# Patient Record
Sex: FEMALE | Race: WHITE | NOT HISPANIC OR LATINO | Employment: UNEMPLOYED | URBAN - METROPOLITAN AREA
[De-identification: names, ages, dates, MRNs, and addresses within clinical notes are randomized per-mention and may not be internally consistent; named-entity substitution may affect disease eponyms.]

---

## 2020-10-07 ENCOUNTER — TELEPHONE (OUTPATIENT)
Dept: SURGICAL ONCOLOGY | Facility: CLINIC | Age: 50
End: 2020-10-07

## 2020-11-30 ENCOUNTER — TELEPHONE (OUTPATIENT)
Dept: SURGICAL ONCOLOGY | Facility: CLINIC | Age: 50
End: 2020-11-30

## 2020-12-01 ENCOUNTER — TELEPHONE (OUTPATIENT)
Dept: SURGICAL ONCOLOGY | Facility: CLINIC | Age: 50
End: 2020-12-01

## 2020-12-15 ENCOUNTER — TELEPHONE (OUTPATIENT)
Dept: SURGICAL ONCOLOGY | Facility: CLINIC | Age: 50
End: 2020-12-15

## 2020-12-16 ENCOUNTER — CONSULT (OUTPATIENT)
Dept: SURGICAL ONCOLOGY | Facility: CLINIC | Age: 50
End: 2020-12-16
Payer: COMMERCIAL

## 2020-12-16 VITALS
HEART RATE: 78 BPM | DIASTOLIC BLOOD PRESSURE: 98 MMHG | WEIGHT: 187 LBS | TEMPERATURE: 98 F | SYSTOLIC BLOOD PRESSURE: 148 MMHG | BODY MASS INDEX: 32.1 KG/M2 | RESPIRATION RATE: 20 BRPM

## 2020-12-16 DIAGNOSIS — Z15.01 BRCA2 GENE MUTATION POSITIVE: ICD-10-CM

## 2020-12-16 DIAGNOSIS — Z91.89 INCREASED RISK OF BREAST CANCER: Primary | ICD-10-CM

## 2020-12-16 DIAGNOSIS — L72.9 SKIN CYST: ICD-10-CM

## 2020-12-16 DIAGNOSIS — Z15.09 BRCA2 GENE MUTATION POSITIVE: ICD-10-CM

## 2020-12-16 PROCEDURE — 99244 OFF/OP CNSLTJ NEW/EST MOD 40: CPT | Performed by: NURSE PRACTITIONER

## 2020-12-16 RX ORDER — LISINOPRIL 10 MG/1
10 TABLET ORAL EVERY MORNING
COMMUNITY

## 2020-12-16 RX ORDER — ALPRAZOLAM 0.25 MG/1
0.25 TABLET ORAL AS NEEDED
COMMUNITY

## 2020-12-16 RX ORDER — CYCLOBENZAPRINE HCL 10 MG
10 TABLET ORAL AS NEEDED
COMMUNITY

## 2020-12-16 RX ORDER — VALACYCLOVIR HYDROCHLORIDE 1 G/1
1000 TABLET, FILM COATED ORAL DAILY
COMMUNITY

## 2020-12-16 RX ORDER — METOPROLOL TARTRATE 50 MG/1
50 TABLET, FILM COATED ORAL EVERY MORNING
COMMUNITY

## 2020-12-16 RX ORDER — HYDROCODONE BITARTRATE AND ACETAMINOPHEN 7.5; 325 MG/1; MG/1
1 TABLET ORAL AS NEEDED
COMMUNITY

## 2020-12-16 RX ORDER — CETIRIZINE HYDROCHLORIDE 10 MG/1
10 TABLET ORAL DAILY
COMMUNITY

## 2021-01-21 ENCOUNTER — CONSULT (OUTPATIENT)
Dept: PLASTIC SURGERY | Facility: CLINIC | Age: 51
End: 2021-01-21
Payer: COMMERCIAL

## 2021-01-21 VITALS
HEIGHT: 64 IN | BODY MASS INDEX: 32.63 KG/M2 | HEART RATE: 69 BPM | DIASTOLIC BLOOD PRESSURE: 92 MMHG | WEIGHT: 191.13 LBS | SYSTOLIC BLOOD PRESSURE: 137 MMHG | TEMPERATURE: 96 F

## 2021-01-21 DIAGNOSIS — Z15.01 BRCA2 GENE MUTATION POSITIVE: ICD-10-CM

## 2021-01-21 DIAGNOSIS — Z15.09 BRCA2 GENE MUTATION POSITIVE: ICD-10-CM

## 2021-01-21 PROCEDURE — 99244 OFF/OP CNSLTJ NEW/EST MOD 40: CPT | Performed by: PLASTIC SURGERY

## 2021-01-21 RX ORDER — MULTIVITAMIN
CAPSULE ORAL
COMMUNITY

## 2021-01-21 RX ORDER — CLINDAMYCIN PHOSPHATE 10 UG/ML
LOTION TOPICAL AS NEEDED
COMMUNITY
Start: 2020-10-01

## 2021-01-21 NOTE — PROGRESS NOTES
Assessment/Plan   Diagnoses and all orders for this visit:    BRCA2 gene mutation positive  -     Ambulatory referral to Plastic Surgery  -     CTA abdomen pelvis w wo contrast; Future    Other orders  -     clindamycin (CLEOCIN T) 1 % lotion; APPLY TO AFFECTED AREA TWICE A DAY  -     Multiple Vitamin (multivitamin) capsule; Take by mouth  -     CALCIUM PO; Take by mouth     Pertinent reconstruction options were presented as outlined below  A detailed conversation was had regarding the patients options for breast reconstruction  Five main points, which are explained to all breast reconstruction patients, were discussed  1) Breast reconstruction is an optional process  In addition, breast reconstruction can be performed in an immediate and delayed fashion  Even if a patient does not opt for reconstruction now, it can performed at a later time  2) Breast reconstruction is a multi-stage process which involves multiple surgeries spaced several months apart  The entire process can take over one year  The patient can stop within this process and/or resume it again at any time  3) The major goal of breast reconstruction is to have the patient look normal in clothing  When naked, there will always be scars and other stigmata of the breast reconstruction process  4) Asymmetries are often present during the reconstruction process  Several operations may be needed, including surgery to the non-cancerous breast, to achieve satisfactory results  5) No matter the reconstructive method, there are ways that the reconstruction can fail and a secondary reconstructive plan would need to be created  Next, a general discussion regarding all available methods of breast reconstruction were discussed  The types of reconstructions described included:    1) Tissue expander and implant based reconstruction, both single and multi-stage approaches    2) Autologous only reconstructions, including free abdominal-tissue based reconstructions  3) Combination procedures, particularly latissismus dorsi flaps combined with either expanders or implants  For each of the reconstruction methods mentioned above, the risks, benefits, alternatives, scarring, and recovery time were discussed in great detail  Specific risks detailed included bleeding, infection, hematoma, seroma, scarring, pain, wound healing complications, flap loss, fat necrosis, capsular contracture, need for implant removal, donor site complications, bulge, hernia, umbilical necrosis, need for urgent reoperation, and need for dressing changes were discussed  The patient would be a candidate for:     -Implant based reconstruction: Yes   -Abdominally based free flap reconstruction: Yes   -LD+implant based reconstruction: Yes      After a long discussion about these factors, the patient would like to pursue Bilateral nipple sparing mastectomy with immediate autologous reconstruction with JAMES flaps  We will plan to see her for a preoperative visit once a surgical date has been identified  60 minutes were spent face to face with the patient, of which over half were counseling and coordination of care  Alivia Felder MD   [facility] Reconstructive Surgery   [address]   [address]   [place], [address] [facility]   Office: [phone]        Subjective   Patient ID: Dallas White is a 48 y o  female  Vitals:    01/21/21 1258   BP: 137/92   Pulse: 69   Temp: (!) 96 °F (35 6 °C)     Mrs Mane Craig is a 48years old female who presents for breast reconstruction consultation  Patient manifest that she has history of BRCA 2 genetic mutation for which she underwent prophylactic hysterectomy and bilateral salpingo oophorectomy in 2015  She has been followed by breast surgery team with routine exams, imaging including Mammograms and MRIs with no suspicions lesions   She has been counseled about a prophylactic mastectomy given high risk of cancer development  She recently had a left breast cyst and was treated with drainage and abx and has recovered  She has family history of BRCA in her mother, two sisters and her daughter  She has Fhx of cancer in her maternal grandmother at age 45 and in one sister at age 43- 45 for which underwent mastectomy with implant reconstruction, but had multiple complications with implants  Patient is      Significant medical history: BRCA  Prior abdominal surgery: hysterectomy and ophorectomy  Tobacco use: none  Current bra size: C  Desired bra size: same  Body mass index is 32 81 kg/m²  PMH/FH of DVT/PE: none  PMH/FH of miscarriages: none    Patient would like to discuss options for breast reconstruction and would really like to avoid implants  The following portions of the patient's history were reviewed and updated as appropriate: allergies, current medications, past family history, past medical history, past social history, past surgical history and problem list     Review of Systems   Constitutional: Negative  HENT: Negative  Eyes: Negative  Respiratory: Negative  Cardiovascular: Negative  Gastrointestinal: Negative  Endocrine: Negative  Genitourinary: Negative  Musculoskeletal: Negative  Skin: Negative  Allergic/Immunologic: Negative  Neurological: Negative  Hematological: Negative  Psychiatric/Behavioral: Negative  Objective   Physical Exam   Constitutional  She appears well-developed and well-nourished  Eyes  Pupils are equal, round, and reactive to light  Cardiovascular: Normal rate  Pulmonary/Chest  Effort normal      Psychiatric  She has a normal mood and affect  Her behavior is normal      Abdomen and Hips  Soft  Hernia confirmed negative in the ventral area  Abdominal shape is panniculus  She has abdominal striae  She has vertical, abdominal skin redundancy  Patient has excess abdominal fat   Excess fat located in the: lower abdomen and jeffrey-umbilical    Soft tissue is adequate for bilateral breast reconstruction      Breast  Breast Measurements:   Right Left   A: Sternal notch to nipple distance (cm) 26 25 5   B: Midsternum to nipple distance (cm)     C: Midclavicle to nipple distance (cm)     D: Nipple to inframammary fold (cm) 10 11   E: Base width (cm) 17 17   F: Inframammary distance (cm) 25 5      Right Left   Areolar diameter (cm) 5 5   Nipple diameter (cm)     Superior tissue pinch test (cm)     Breast ptosis (grade 0-3) 1 1         Her breasts have normal envelope compliance  Her breasts have adequate tissue coverage  Additional breast conditions include the following:   Right Breast: She is negative for a right mass  Left Breast: She is negative for a left mass

## 2021-02-11 ENCOUNTER — TELEPHONE (OUTPATIENT)
Dept: SURGICAL ONCOLOGY | Facility: CLINIC | Age: 51
End: 2021-02-11

## 2021-02-15 ENCOUNTER — HOSPITAL ENCOUNTER (OUTPATIENT)
Dept: RADIOLOGY | Facility: HOSPITAL | Age: 51
Discharge: HOME | End: 2021-02-15
Attending: SURGERY
Payer: COMMERCIAL

## 2021-02-15 ENCOUNTER — APPOINTMENT (OUTPATIENT)
Dept: LAB | Facility: CLINIC | Age: 51
End: 2021-02-15
Payer: COMMERCIAL

## 2021-02-15 ENCOUNTER — LAB (OUTPATIENT)
Dept: LAB | Facility: CLINIC | Age: 51
End: 2021-02-15
Payer: COMMERCIAL

## 2021-02-15 ENCOUNTER — OFFICE VISIT (OUTPATIENT)
Dept: SURGICAL ONCOLOGY | Facility: CLINIC | Age: 51
End: 2021-02-15
Payer: COMMERCIAL

## 2021-02-15 ENCOUNTER — TRANSCRIBE ORDERS (OUTPATIENT)
Dept: RADIOLOGY | Facility: HOSPITAL | Age: 51
End: 2021-02-15

## 2021-02-15 VITALS
BODY MASS INDEX: 32.44 KG/M2 | HEART RATE: 60 BPM | SYSTOLIC BLOOD PRESSURE: 112 MMHG | TEMPERATURE: 98 F | WEIGHT: 190 LBS | DIASTOLIC BLOOD PRESSURE: 74 MMHG | RESPIRATION RATE: 14 BRPM | HEIGHT: 64 IN

## 2021-02-15 DIAGNOSIS — Z15.09 BRCA2 GENE MUTATION POSITIVE: ICD-10-CM

## 2021-02-15 DIAGNOSIS — Z01.818 PREOPERATIVE TESTING: ICD-10-CM

## 2021-02-15 DIAGNOSIS — Z15.01 BRCA2 GENE MUTATION POSITIVE: ICD-10-CM

## 2021-02-15 DIAGNOSIS — Z91.89 INCREASED RISK OF BREAST CANCER: ICD-10-CM

## 2021-02-15 DIAGNOSIS — Z01.818 PREOP EXAMINATION: Primary | ICD-10-CM

## 2021-02-15 LAB
ALBUMIN SERPL BCP-MCNC: 3.8 G/DL (ref 3.5–5)
ALP SERPL-CCNC: 99 U/L (ref 46–116)
ALT SERPL W P-5'-P-CCNC: 29 U/L (ref 12–78)
ANION GAP SERPL CALCULATED.3IONS-SCNC: 10 MMOL/L (ref 4–13)
AST SERPL W P-5'-P-CCNC: 20 U/L (ref 5–45)
BASOPHILS # BLD AUTO: 0.1 THOUSANDS/ΜL (ref 0–0.1)
BASOPHILS NFR BLD AUTO: 1 % (ref 0–1)
BILIRUB SERPL-MCNC: 0.34 MG/DL (ref 0.2–1)
BUN SERPL-MCNC: 17 MG/DL (ref 5–25)
CALCIUM SERPL-MCNC: 9.3 MG/DL (ref 8.3–10.1)
CHLORIDE SERPL-SCNC: 104 MMOL/L (ref 100–108)
CO2 SERPL-SCNC: 28 MMOL/L (ref 21–32)
CREAT SERPL-MCNC: 0.66 MG/DL (ref 0.6–1.3)
EOSINOPHIL # BLD AUTO: 0.22 THOUSAND/ΜL (ref 0–0.61)
EOSINOPHIL NFR BLD AUTO: 3 % (ref 0–6)
ERYTHROCYTE [DISTWIDTH] IN BLOOD BY AUTOMATED COUNT: 12.1 % (ref 11.6–15.1)
GFR SERPL CREATININE-BSD FRML MDRD: 103 ML/MIN/1.73SQ M
GLUCOSE P FAST SERPL-MCNC: 87 MG/DL (ref 65–99)
HCT VFR BLD AUTO: 43.7 % (ref 34.8–46.1)
HGB BLD-MCNC: 14.6 G/DL (ref 11.5–15.4)
IMM GRANULOCYTES # BLD AUTO: 0.02 THOUSAND/UL (ref 0–0.2)
IMM GRANULOCYTES NFR BLD AUTO: 0 % (ref 0–2)
LYMPHOCYTES # BLD AUTO: 1.44 THOUSANDS/ΜL (ref 0.6–4.47)
LYMPHOCYTES NFR BLD AUTO: 20 % (ref 14–44)
MCH RBC QN AUTO: 31 PG (ref 26.8–34.3)
MCHC RBC AUTO-ENTMCNC: 33.4 G/DL (ref 31.4–37.4)
MCV RBC AUTO: 93 FL (ref 82–98)
MONOCYTES # BLD AUTO: 0.49 THOUSAND/ΜL (ref 0.17–1.22)
MONOCYTES NFR BLD AUTO: 7 % (ref 4–12)
NEUTROPHILS # BLD AUTO: 5.07 THOUSANDS/ΜL (ref 1.85–7.62)
NEUTS SEG NFR BLD AUTO: 69 % (ref 43–75)
NRBC BLD AUTO-RTO: 0 /100 WBCS
PLATELET # BLD AUTO: 348 THOUSANDS/UL (ref 149–390)
PMV BLD AUTO: 9.9 FL (ref 8.9–12.7)
POTASSIUM SERPL-SCNC: 4 MMOL/L (ref 3.5–5.3)
PROT SERPL-MCNC: 8 G/DL (ref 6.4–8.2)
RBC # BLD AUTO: 4.71 MILLION/UL (ref 3.81–5.12)
SODIUM SERPL-SCNC: 142 MMOL/L (ref 136–145)
WBC # BLD AUTO: 7.34 THOUSAND/UL (ref 4.31–10.16)

## 2021-02-15 PROCEDURE — 85025 COMPLETE CBC W/AUTO DIFF WBC: CPT

## 2021-02-15 PROCEDURE — 80053 COMPREHEN METABOLIC PANEL: CPT

## 2021-02-15 PROCEDURE — 36415 COLL VENOUS BLD VENIPUNCTURE: CPT

## 2021-02-15 PROCEDURE — 71046 X-RAY EXAM CHEST 2 VIEWS: CPT

## 2021-02-15 PROCEDURE — 99215 OFFICE O/P EST HI 40 MIN: CPT | Performed by: SURGERY

## 2021-02-15 PROCEDURE — 93005 ELECTROCARDIOGRAM TRACING: CPT

## 2021-02-15 NOTE — PROGRESS NOTES
Surgical Oncology Follow Up       [address] [facility] SURGICAL ONCOLOGY [place]  [address] [facility] [address]    Liudmila Islas  1970  [id number]  [address] [facility]COL[facility] [place]  [address] [facility] [address]    Chief Complaint   Patient presents with    Follow-up     Pt is here for a three month follow up          Assessment & Plan:   Patient presents for follow-up visit  She has seen her advanced practitioner for her original consult regarding a BRCA 2 mutation  She has seen Plastic surgery is recommended bilateral nipple sparing Macarena flaps  The patient I went through the process of informed consent for this  She was not clear where the incisions would be made  Cancer History:     Oncology History    No history exists  Interval History:     Patient was diagnosed with a BRCA 2 mutation many years ago and saw our Genetics counselor who conveyed her results to her at that time  She has been follow also with Dr Jesse Bo in the practice  She has had recent mammograms and MRIs which were benign  I    Review of Systems:   Review of Systems   Constitutional: Negative for activity change, appetite change and fatigue  HENT: Negative  Eyes: Negative  Respiratory: Negative for cough, shortness of breath and wheezing  Cardiovascular: Negative for chest pain and leg swelling  Gastrointestinal: Negative  Endocrine: Negative  Genitourinary: Negative  Musculoskeletal:        No new changes or complaints of bone pain   Skin: Negative  Allergic/Immunologic: Negative  Neurological: Negative  Hematological: Negative  Psychiatric/Behavioral: Negative  Past Medical History     Patient Active Problem List   Diagnosis    Increased risk of breast cancer    BRCA2 gene mutation positive    Skin cyst     No past medical history on file    Past Surgical History:   Procedure Laterality Date    CARPAL TUNNEL RELEASE Bilateral 12/2017    LAPAROSCOPIC TOTAL HYSTERECTOMY      OOPHORECTOMY Bilateral 02/27/2015     Family History   Problem Relation Age of Onset    Ovarian cancer Mother 76    BRCA2 Positive Mother     Breast cancer Sister 40    BRCA2 Positive Sister     BRCA2 Positive Daughter     Breast cancer Maternal Grandmother 45    BRCA2 Positive Sister      Social History     Socioeconomic History    Marital status:      Spouse name: Not on file    Number of children: Not on file    Years of education: Not on file    Highest education level: Not on file   Occupational History    Not on file   Social Needs    Financial resource strain: Not on file    Food insecurity     Worry: Not on file     Inability: Not on file    Transportation needs     Medical: Not on file     Non-medical: Not on file   Tobacco Use    Smoking status: Never Smoker    Smokeless tobacco: Never Used   Substance and Sexual Activity    Alcohol use: Yes     Frequency: 2-3 times a week    Drug use: Never    Sexual activity: Not on file   Lifestyle    Physical activity     Days per week: Not on file     Minutes per session: Not on file    Stress: Not on file   Relationships    Social connections     Talks on phone: Not on file     Gets together: Not on file     Attends Mandaeism service: Not on file     Active member of club or organization: Not on file     Attends meetings of clubs or organizations: Not on file     Relationship status: Not on file    Intimate partner violence     Fear of current or ex partner: Not on file     Emotionally abused: Not on file     Physically abused: Not on file     Forced sexual activity: Not on file   Other Topics Concern    Not on file   Social History Narrative    Not on file       Current Outpatient Medications:     ALPRAZolam (XANAX) 0 25 mg tablet, Take 0 25 mg by mouth as needed for anxiety, Disp: , Rfl:     CALCIUM PO, Take by mouth, Disp: , Rfl:    cetirizine (ZyrTEC) 10 mg tablet, Take 10 mg by mouth daily, Disp: , Rfl:     clindamycin (CLEOCIN T) 1 % lotion, APPLY TO AFFECTED AREA TWICE A DAY, Disp: , Rfl:     cyclobenzaprine (FLEXERIL) 10 mg tablet, Take 10 mg by mouth as needed for muscle spasms, Disp: , Rfl:     HYDROcodone-acetaminophen (NORCO) 7 5-325 mg per tablet, Take 1 tablet by mouth as needed for pain, Disp: , Rfl:     lisinopril (ZESTRIL) 10 mg tablet, Take 10 mg by mouth daily, Disp: , Rfl:     metoprolol tartrate (LOPRESSOR) 50 mg tablet, Take 50 mg by mouth daily, Disp: , Rfl:     Multiple Vitamin (multivitamin) capsule, Take by mouth, Disp: , Rfl:     sertraline (ZOLOFT) 50 mg tablet, Take 50 mg by mouth daily, Disp: , Rfl:     valACYclovir (VALTREX) 1,000 mg tablet, Take 1,000 mg by mouth daily, Disp: , Rfl:   Allergies   Allergen Reactions    Amoxicillin-Pot Clavulanate Diarrhea       Physical Exam:     Vitals:    02/15/21 0801   BP: 112/74   Pulse: 60   Resp: 14   Temp: 98 °F (36 7 °C)     Physical Exam  Vitals signs reviewed  Constitutional:       Appearance: She is well-developed  HENT:      Head: Normocephalic and atraumatic  Eyes:      Pupils: Pupils are equal, round, and reactive to light  Neck:      Musculoskeletal: Normal range of motion and neck supple  Thyroid: No thyromegaly  Vascular: No JVD  Trachea: No tracheal deviation  Cardiovascular:      Rate and Rhythm: Normal rate and regular rhythm  Heart sounds: Normal heart sounds  No murmur  No friction rub  No gallop  Pulmonary:      Effort: Pulmonary effort is normal  No respiratory distress  Breath sounds: Normal breath sounds  No wheezing or rales  Chest:      Comments: The right breast was examined in the sitting and supine position  There are no worrisome skin changes, tenderness, inverted nipple, nipple discharge, swelling, bleeding or evidence of a mass in any quadrant    Jean survey demonstrated no evidence of any clinically suspicious axillary, pectoral or paraclavicular lymph nodes  The left breast was examined in the sitting and supine position  There are no worrisome skin changes, tenderness, inverted nipple, nipple discharge, swelling, bleeding or evidence of a mass in any quadrant  Jean survey demonstrated no evidence of any clinically suspicious axillary, pectoral or paraclavicular lymph nodes  Abdominal:      General: There is no distension  Palpations: Abdomen is soft  There is no hepatomegaly or mass  Tenderness: There is no abdominal tenderness  There is no guarding or rebound  Musculoskeletal: Normal range of motion  General: No tenderness  Lymphadenopathy:      Cervical: No cervical adenopathy  Skin:     General: Skin is warm and dry  Findings: No erythema or rash  Neurological:      Mental Status: She is alert and oriented to person, place, and time  Cranial Nerves: No cranial nerve deficit  Psychiatric:         Behavior: Behavior normal            Results & Discussion:    the patient had thought about reconstructed for many years  Her family members have had reconstruction as well  She is well for educated regarding the process  All questions were answered the patient's satisfaction  The patient and I underwent the process of consent for   Bilateral nipple sparing mastectomies  The complications outlined on the consent including relatively minor problems (wound infection, wound healing problems, hematomas, scarring, chronic discomfort/pain), moderate problems (injury to nerves or blood vessels, allergic reactions) and major complications (extensive blood loss requiring transfusions or possible addtional surgeries, cardiac arrest, stroke and possible death)  We also reviewed specific complications as outlined on the consent form including  Possible development of breast cancer in the future though unlikely    All questions were answered to the patient's satisfaction  We will coordinate the surgery at our next mutual convience  Advance Care Planning/Advance Directives:  I discussed the disease status, treatment plans and follow-up with the patient

## 2021-02-15 NOTE — H&P (VIEW-ONLY)
Surgical Oncology Follow Up       [address] [facility],[address] [place] [address]  [facility] [place]  [address] [facility] [address]    Roderick Hensley  1970  [id number]  [address] [facility],[address] [place] [address]  [facility] [place]  [address] [facility] [address]    Chief Complaint   Patient presents with    Follow-up     Pt is here for a three month follow up          Assessment & Plan:   Patient presents for follow-up visit  She has seen her advanced practitioner for her original consult regarding a BRCA 2 mutation  She has seen Plastic surgery is recommended bilateral nipple sparing Macarena flaps  The patient I went through the process of informed consent for this  She was not clear where the incisions would be made  Cancer History:     Oncology History    No history exists  Interval History:     Patient was diagnosed with a BRCA 2 mutation many years ago and saw our Genetics counselor who conveyed her results to her at that time  She has been follow also with Dr Almaraz in the practice  She has had recent mammograms and MRIs which were benign  I    Review of Systems:   Review of Systems   Constitutional: Negative for activity change, appetite change and fatigue  HENT: Negative  Eyes: Negative  Respiratory: Negative for cough, shortness of breath and wheezing  Cardiovascular: Negative for chest pain and leg swelling  Gastrointestinal: Negative  Endocrine: Negative  Genitourinary: Negative  Musculoskeletal:        No new changes or complaints of bone pain   Skin: Negative  Allergic/Immunologic: Negative  Neurological: Negative  Hematological: Negative  Psychiatric/Behavioral: Negative  Past Medical History     Patient Active Problem List   Diagnosis    Increased risk of breast cancer    BRCA2 gene mutation positive    Skin cyst     No past medical history on file    Past Surgical History:   Procedure Laterality Date    CARPAL TUNNEL RELEASE Bilateral 12/2017    LAPAROSCOPIC TOTAL HYSTERECTOMY      OOPHORECTOMY Bilateral 02/27/2015     Family History   Problem Relation Age of Onset    Ovarian cancer Mother 76    BRCA2 Positive Mother     Breast cancer Sister 40    BRCA2 Positive Sister     BRCA2 Positive Daughter     Breast cancer Maternal Grandmother 45    BRCA2 Positive Sister      Social History     Socioeconomic History    Marital status:      Spouse name: Not on file    Number of children: Not on file    Years of education: Not on file    Highest education level: Not on file   Occupational History    Not on file   Social Needs    Financial resource strain: Not on file    Food insecurity     Worry: Not on file     Inability: Not on file    Transportation needs     Medical: Not on file     Non-medical: Not on file   Tobacco Use    Smoking status: Never Smoker    Smokeless tobacco: Never Used   Substance and Sexual Activity    Alcohol use: Yes     Frequency: 2-3 times a week    Drug use: Never    Sexual activity: Not on file   Lifestyle    Physical activity     Days per week: Not on file     Minutes per session: Not on file    Stress: Not on file   Relationships    Social connections     Talks on phone: Not on file     Gets together: Not on file     Attends Church service: Not on file     Active member of club or organization: Not on file     Attends meetings of clubs or organizations: Not on file     Relationship status: Not on file    Intimate partner violence     Fear of current or ex partner: Not on file     Emotionally abused: Not on file     Physically abused: Not on file     Forced sexual activity: Not on file   Other Topics Concern    Not on file   Social History Narrative    Not on file       Current Outpatient Medications:     ALPRAZolam (XANAX) 0 25 mg tablet, Take 0 25 mg by mouth as needed for anxiety, Disp: , Rfl:     CALCIUM PO, Take by mouth, Disp: , Rfl:    cetirizine (ZyrTEC) 10 mg tablet, Take 10 mg by mouth daily, Disp: , Rfl:     clindamycin (CLEOCIN T) 1 % lotion, APPLY TO AFFECTED AREA TWICE A DAY, Disp: , Rfl:     cyclobenzaprine (FLEXERIL) 10 mg tablet, Take 10 mg by mouth as needed for muscle spasms, Disp: , Rfl:     HYDROcodone-acetaminophen (NORCO) 7 5-325 mg per tablet, Take 1 tablet by mouth as needed for pain, Disp: , Rfl:     lisinopril (ZESTRIL) 10 mg tablet, Take 10 mg by mouth daily, Disp: , Rfl:     metoprolol tartrate (LOPRESSOR) 50 mg tablet, Take 50 mg by mouth daily, Disp: , Rfl:     Multiple Vitamin (multivitamin) capsule, Take by mouth, Disp: , Rfl:     sertraline (ZOLOFT) 50 mg tablet, Take 50 mg by mouth daily, Disp: , Rfl:     valACYclovir (VALTREX) 1,000 mg tablet, Take 1,000 mg by mouth daily, Disp: , Rfl:   Allergies   Allergen Reactions    Amoxicillin-Pot Clavulanate Diarrhea       Physical Exam:     Vitals:    02/15/21 0801   BP: 112/74   Pulse: 60   Resp: 14   Temp: 98 °F (36 7 °C)     Physical Exam  Vitals signs reviewed  Constitutional:       Appearance: She is well-developed  HENT:      Head: Normocephalic and atraumatic  Eyes:      Pupils: Pupils are equal, round, and reactive to light  Neck:      Musculoskeletal: Normal range of motion and neck supple  Thyroid: No thyromegaly  Vascular: No JVD  Trachea: No tracheal deviation  Cardiovascular:      Rate and Rhythm: Normal rate and regular rhythm  Heart sounds: Normal heart sounds  No murmur  No friction rub  No gallop  Pulmonary:      Effort: Pulmonary effort is normal  No respiratory distress  Breath sounds: Normal breath sounds  No wheezing or rales  Chest:      Comments: The right breast was examined in the sitting and supine position  There are no worrisome skin changes, tenderness, inverted nipple, nipple discharge, swelling, bleeding or evidence of a mass in any quadrant    Jean survey demonstrated no evidence of any clinically suspicious axillary, pectoral or paraclavicular lymph nodes  The left breast was examined in the sitting and supine position  There are no worrisome skin changes, tenderness, inverted nipple, nipple discharge, swelling, bleeding or evidence of a mass in any quadrant  Jean survey demonstrated no evidence of any clinically suspicious axillary, pectoral or paraclavicular lymph nodes  Abdominal:      General: There is no distension  Palpations: Abdomen is soft  There is no hepatomegaly or mass  Tenderness: There is no abdominal tenderness  There is no guarding or rebound  Musculoskeletal: Normal range of motion  General: No tenderness  Lymphadenopathy:      Cervical: No cervical adenopathy  Skin:     General: Skin is warm and dry  Findings: No erythema or rash  Neurological:      Mental Status: She is alert and oriented to person, place, and time  Cranial Nerves: No cranial nerve deficit  Psychiatric:         Behavior: Behavior normal            Results & Discussion:    the patient had thought about reconstructed for many years  Her family members have had reconstruction as well  She is well for educated regarding the process  All questions were answered the patient's satisfaction  The patient and I underwent the process of consent for   Bilateral nipple sparing mastectomies  The complications outlined on the consent including relatively minor problems (wound infection, wound healing problems, hematomas, scarring, chronic discomfort/pain), moderate problems (injury to nerves or blood vessels, allergic reactions) and major complications (extensive blood loss requiring transfusions or possible addtional surgeries, cardiac arrest, stroke and possible death)  We also reviewed specific complications as outlined on the consent form including  Possible development of breast cancer in the future though unlikely    All questions were answered to the patient's satisfaction  We will coordinate the surgery at our next mutual convience  Advance Care Planning/Advance Directives:  I discussed the disease status, treatment plans and follow-up with the patient

## 2021-02-15 NOTE — PATIENT INSTRUCTIONS
Pre-Surgery Instructions:   Medication Instructions    ALPRAZolam (XANAX) 0 25 mg tablet Take morning of surgery IF NEEDED    CALCIUM PO Stop taking 1 week prior to surgery    cetirizine (ZyrTEC) 10 mg tablet Stop taking 1 days prior to surgery    clindamycin (CLEOCIN T) 1 % lotion Stop taking 1 days prior to surgery    cyclobenzaprine (FLEXERIL) 10 mg tablet Stop taking 1 days prior to surgery    HYDROcodone-acetaminophen (NORCO) 7 5-325 mg per tablet Stop taking 1 days prior to surgery    lisinopril (ZESTRIL) 10 mg tablet Stop taking 1 days prior to surgery    metoprolol tartrate (LOPRESSOR) 50 mg tablet Take morning of surgery    Multiple Vitamin (multivitamin) capsule Stop taking 1 week prior to surgery    sertraline (ZOLOFT) 50 mg tablet Take morning of surgery    valACYclovir (VALTREX) 1,000 mg tablet Stop taking 1 days prior to surgery           Mastectomy   AMBULATORY CARE:   What you need to know about a mastectomy:  A mastectomy is surgery to remove all or part of one or both breasts  Tissue, lymph nodes, or muscle near the breast may also be removed  A mastectomy may be done to treat breast cancer or prevent the cancer from spreading  The type of mastectomy used depends on the size of the tumor and if the cancer has spread  A mastectomy can also be done to prevent breast cancer  This may be a choice if you are at high risk for breast cancer  How to prepare for a mastectomy:   · Your surgeon will talk to you about how to prepare for surgery  He or she may tell you not to eat or drink anything after midnight on the day before your surgery  Arrange for someone to drive you home and stay with you after surgery  This person can help care for you and watch for complications from surgery  · Tell your surgeon about all medicines you currently take  He or she will tell you if you need to stop any medicine for the surgery, and when to stop   You may need to stop taking aspirin or blood thinners several days before surgery  He or she will tell you which medicines to take or not take on the day of surgery  · Tell your healthcare provider or surgeon about all your allergies, including allergic reactions to medicine, anesthesia, or antibiotics  What will happen during a mastectomy:   · You will be given general anesthesia to keep you asleep and free from pain during surgery  You may be given an antibiotic to help prevent a bacterial infection  · Your surgeon will make an incision over your breast  He or she will remove the tumor and breast tissue  The nipple and areola (area around the nipple) may be removed  Surgery that leaves these in place is called nipple-sparing mastectomy  Your surgeon may also remove muscle from behind your breast  If lymph nodes will be taken, a small incision will be made in your armpit  The lymph nodes will be removed and tested for cancer  · Your surgeon may leave extra skin if you are having reconstruction surgery  This surgery is called skin-sparing mastectomy  Reconstruction surgery helps make the breast look more natural  It is done right after the mastectomy  · One or more drains may be inserted near your incision  A drain removes extra fluid and helps your incision heal  Your surgeon will close your incision with stitches and cover it with a bandage  He or she may also wrap a tight-fitting bandage around both of your breasts  This may decrease swelling, bleeding, and pain  What to expect after a mastectomy:   · You will be helped to walk around after surgery to help prevent blood clots  · Healthcare providers will monitor you until you are awake  You may need to spend 1 to 2 nights in the hospital     · You may have trouble moving the arm closest to your mastectomy  This should get better in a few days  Risks of a mastectomy:  You may bleed more than expected or develop an infection   Nerves, blood vessels, and muscles may be damaged during your surgery  Blood or fluid may collect under your skin  You may need other procedures to remove the fluid or blood  You may have swelling in the arm closest to the mastectomy or where lymph nodes were removed  This swelling is called lymphedema  Lymphedema may cause tingling, numbness, stiffness, and weakness in your arm  This may be permanent  You may get a blood clot in your arm or leg  The blood clot may travel to your heart, lungs, or brain  This may become life-threatening  Call your local emergency number (911 in the [address] [facility]) for any of the following:   · You feel lightheaded, short of breath, and have chest pain  · You have trouble breathing  · You cough up blood  Seek care immediately if:   · Blood soaks through your bandage  · Your stitches come apart  · Your bruise suddenly gets bigger  · Your leg or arm is larger than usual and painful  Call your doctor or surgeon if:   · You have a fever or chills  · Your wound is red, swollen, or draining pus  · You have nausea or are vomiting  · Your skin is itchy, swollen, or you have a rash  · Your pain does not get better after you take pain medicine  · Your drain falls out or stops draining fluid  · Your drain has pus or foul-smelling fluid coming out of it  · You have numbness, tingling, or swelling in your arm or hand  · You feel very sad or anxious, or are having trouble coping with changes from the mastectomy  · You have questions or concerns about your condition or care  Medicines: You may need any of the following:  · Antibiotics  help prevent a bacterial infection  · Prescription pain medicine  may be given  Ask your healthcare provider how to take this medicine safely  Some prescription pain medicines contain acetaminophen  Do not take other medicines that contain acetaminophen without talking to your healthcare provider  Too much acetaminophen may cause liver damage   Prescription pain medicine may cause constipation  Ask your healthcare provider how to prevent or treat constipation  · NSAIDs , such as ibuprofen, help decrease swelling, pain, and fever  NSAIDs can cause stomach bleeding or kidney problems in certain people  If you take blood thinner medicine, always ask your healthcare provider if NSAIDs are safe for you  Always read the medicine label and follow directions  · Take your medicine as directed  Contact your healthcare provider if you think your medicine is not helping or if you have side effects  Tell him or her if you are allergic to any medicine  Keep a list of the medicines, vitamins, and herbs you take  Include the amounts, and when and why you take them  Bring the list or the pill bottles to follow-up visits  Carry your medicine list with you in case of an emergency  Care for your wound as directed: If you have a tight-fitting bandage, you can remove it in 24 to 48 hours, or as directed  Ask your healthcare provider when your incision can get wet  You may need to take a sponge bath until your drain is removed  Carefully wash around the incision with soap and water  It is okay to allow the soap and water to gently run over your incision  Gently pat dry the area and put on new, clean bandages as directed  Change your bandages when they get wet or dirty  If lymph nodes were removed from your armpit, ask your healthcare provider when you can wear deodorant  Check your incision every day for redness, pus, or swelling  Self-care:   · Apply ice  on your incision for 15 to 20 minutes every hour or as directed  Use an ice pack, or put crushed ice in a plastic bag  Cover it with a towel  Ice helps prevent tissue damage and decreases swelling and pain  · Elevate  your arm nearest to your incision above the level of your heart  Do this as often as you can  This will help decrease swelling and pain  Prop your arm on pillows or blankets to keep it elevated comfortably  · Rest  as directed   Do not lift anything heavier than 5 pounds  Do not push or pull with your arms  You can use your arms to groom, eat, and bathe  Take short walks around the house  Gradually walk further as you feel better  Ask your healthcare provider when you can return to your normal activities  · Do not sleep on your stomach  This will put too much pressure on your incision  Sleep on your back or on the opposite side of your incision  · Empty your drain  as directed  You may need to write down how much fluid you empty from your drain each day  Ask your healthcare provider for more information about how to empty your drain  · Wear a supportive bra  as directed  Wait until you remove the tight-fitting bandage to wear a bra  You may be given a surgical bra or told to wear a sports bra  A supportive bra may help hold your bandages in place  It may also help with swelling and pain  Do not  wear bras with lace or underwire  They may rub against your incision and cause discomfort  Arm stretches: Your healthcare provider may show you how to do arm stretches  Arm stretches may prevent stiff arms or shoulders  You may need to wait until after your drains are removed to begin stretching  Do not do arm stretches until your healthcare provider says it is okay  Ask your healthcare provider for more information about arm stretches  Follow up with your doctor or surgeon as directed:  Write down your questions so you remember to ask them during your visits  For support and more information:  You may have difficulty coping with the changes to your body  Talk to your family or friends about how you are feeling  Ask your healthcare provider about support groups  It may be helpful to talk with other women who have had a mastectomy  · [address] [place] [address]  Phone: [phone]  Web Address: http://[employer]  org  · [address] [facility], [address]  [place] [address]  Phone: [phone]  Web Address: http://[employer]/  [address] Information is for End User's use only and may not be sold, redistributed or otherwise used for commercial purposes  All illustrations and images included in CareNotes® are the copyrighted property of [employer] M [employer] Inc  or [employer]  The above information is an  only  It is not intended as medical advice for individual conditions or treatments  Talk to your doctor, nurse or pharmacist before following any medical regimen to see if it is safe and effective for you  Sean-Franco Drain Care   AMBULATORY CARE:   A Sean-Franco (THERON) drain  is used to remove fluids that build up in an area of your body after surgery  The THERON drain is a bulb shaped device connected to a tube  One end of the tube is placed inside you during surgery  The other end comes out through a small cut in your skin  The bulb is connected to this end  You may have a stitch to hold the tube in place  Seek care immediately if:   · Your THERON drain breaks or comes out  · You have cloudy yellow or brown drainage from your THERON drain site, or the drainage smells bad  Contact your healthcare provider if:   · You drain less than 30 milliliters (2 tablespoons) in 24 hours  This may mean your drain can be removed  · You suddenly stop draining fluid or think your THERON drain is blocked  · You have a fever higher than 101 5°F (38 6°C)  · You have increased pain, redness, or swelling around the drain site  · You have questions about your THERON drain care  How a Sean-Franco drain works: The THERON drain removes fluids by creating suction in the tube  The bulb is squeezed flat and connected to the tube that sticks out of your body  The bulb expands as it fills with fluid  How to change the bandage around your Sean-Franco drain:  If you have a bandage, change it once a day   You may need to change your bandage more than once a day if it gets completely wet  · Wash your hands with soap and water  · Loosen the tape and gently remove the old bandage  Throw the old bandage into a plastic trash bag  · Use soap and water or saline (salt water) solution to clean your THERON drain site  Dip a cotton swab or gauze pad in the solution and gently clean your skin  · Pat the area dry  · Place a new bandage on your THERON drain site and secure it to your skin with medical tape  · Wash your hands  How to empty the Sean-Franco drain:  Empty the bulb when it is half full or every 8 to 12 hours  · Wash your hands with soap and water  · Remove the plug from the bulb  · Pour the fluid into a measuring cup  · Clean the plug with an alcohol swab or a cotton ball dipped in rubbing alcohol  · Squeeze the bulb flat and put the plug back in  The bulb should stay flat until it starts to fill with fluid again  · Measure the amount of fluid you pour out  Write down how much fluid you empty from the THERON drain and the date and time you collected it  · Flush the fluid down the toilet  Wash your hands  Clear clogged tubing: Use the following steps to clear your Sean-Franco tubing:  · Hold the tubing between your thumb and first finger at the place closest to your skin  This hand will prevent the tube from being pulled out of your skin  · Use your other thumb and first finger to slide the clog down the tubing toward the bulb  You may have to repeat the sliding until the tubing is unclogged  Sean-Franco drain removal:  The amount of fluid that you drain will decrease as your wound heals  The THERON drain usually is removed when less than 30 milliliters (2 tablespoons) is collected in 24 hours  Ask your healthcare provider when and how your THERON drain will be removed  Follow up with your healthcare provider as directed:  Write down your questions so you remember to ask them during your visits     © Copyright [employer] [facility] Information is for [employer] use only and may not be sold, redistributed or otherwise used for commercial purposes  All illustrations and images included in CareNotes® are the copyrighted property of A D A M , Inc  or Lesa Ballesteros   The above information is an  only  It is not intended as medical advice for individual conditions or treatments  Talk to your doctor, nurse or pharmacist before following any medical regimen to see if it is safe and effective for you

## 2021-02-19 LAB
ATRIAL RATE: 58 BPM
P AXIS: 51 DEGREES
PR INTERVAL: 172 MS
QRS AXIS: 20 DEGREES
QRSD INTERVAL: 88 MS
QT INTERVAL: 434 MS
QTC INTERVAL: 426 MS
T WAVE AXIS: 20 DEGREES
VENTRICULAR RATE: 58 BPM

## 2021-02-19 PROCEDURE — 93010 ELECTROCARDIOGRAM REPORT: CPT | Performed by: INTERNAL MEDICINE

## 2021-03-08 ENCOUNTER — HOSPITAL ENCOUNTER (OUTPATIENT)
Dept: CT IMAGING | Facility: HOSPITAL | Age: 51
Discharge: HOME | End: 2021-03-08
Attending: PLASTIC SURGERY
Payer: COMMERCIAL

## 2021-03-08 DIAGNOSIS — Z15.09 BRCA2 GENE MUTATION POSITIVE: ICD-10-CM

## 2021-03-08 DIAGNOSIS — Z01.818 PREOPERATIVE TESTING: ICD-10-CM

## 2021-03-08 DIAGNOSIS — Z15.01 BRCA2 GENE MUTATION POSITIVE: ICD-10-CM

## 2021-03-08 LAB — SARS-COV-2 RNA RESP QL NAA+PROBE: NEGATIVE

## 2021-03-08 PROCEDURE — G1004 CDSM NDSC: HCPCS

## 2021-03-08 PROCEDURE — 74174 CTA ABD&PLVS W/CONTRAST: CPT

## 2021-03-08 PROCEDURE — U0005 INFEC AGEN DETEC AMPLI PROBE: HCPCS | Performed by: SURGERY

## 2021-03-08 PROCEDURE — U0003 INFECTIOUS AGENT DETECTION BY NUCLEIC ACID (DNA OR RNA); SEVERE ACUTE RESPIRATORY SYNDROME CORONAVIRUS 2 (SARS-COV-2) (CORONAVIRUS DISEASE [COVID-19]), AMPLIFIED PROBE TECHNIQUE, MAKING USE OF HIGH THROUGHPUT TECHNOLOGIES AS DESCRIBED BY CMS-2020-01-R: HCPCS | Performed by: SURGERY

## 2021-03-08 RX ADMIN — IOHEXOL 100 ML: 350 INJECTION, SOLUTION INTRAVENOUS at 16:07

## 2021-03-11 ENCOUNTER — OFFICE VISIT (OUTPATIENT)
Dept: PLASTIC SURGERY | Facility: CLINIC | Age: 51
End: 2021-03-11

## 2021-03-11 VITALS
TEMPERATURE: 96.8 F | WEIGHT: 194.38 LBS | SYSTOLIC BLOOD PRESSURE: 138 MMHG | HEIGHT: 64 IN | DIASTOLIC BLOOD PRESSURE: 84 MMHG | HEART RATE: 55 BPM | BODY MASS INDEX: 33.19 KG/M2

## 2021-03-11 DIAGNOSIS — Z15.01 BRCA2 GENE MUTATION POSITIVE: Primary | ICD-10-CM

## 2021-03-11 DIAGNOSIS — Z15.09 BRCA2 GENE MUTATION POSITIVE: Primary | ICD-10-CM

## 2021-03-11 PROCEDURE — PREOP: Performed by: PLASTIC SURGERY

## 2021-03-11 RX ORDER — NALOXONE HYDROCHLORIDE 4 MG/.1ML
SPRAY NASAL
Qty: 1 EACH | Refills: 0 | Status: SHIPPED | OUTPATIENT
Start: 2021-03-11

## 2021-03-11 RX ORDER — IBUPROFEN 800 MG/1
800 TABLET ORAL EVERY 8 HOURS SCHEDULED
Qty: 60 TABLET | Refills: 0 | Status: SHIPPED | OUTPATIENT
Start: 2021-03-11 | End: 2021-08-06

## 2021-03-11 RX ORDER — GABAPENTIN 300 MG/1
300 CAPSULE ORAL 3 TIMES DAILY
Qty: 21 CAPSULE | Refills: 0 | Status: SHIPPED | OUTPATIENT
Start: 2021-03-11 | End: 2021-08-06

## 2021-03-11 RX ORDER — OXYCODONE HYDROCHLORIDE 5 MG/1
5 TABLET ORAL EVERY 6 HOURS PRN
Qty: 25 TABLET | Refills: 0 | Status: SHIPPED | OUTPATIENT
Start: 2021-03-11 | End: 2021-08-06

## 2021-03-11 RX ORDER — ACETAMINOPHEN 500 MG
1000 TABLET ORAL 3 TIMES DAILY
Qty: 60 TABLET | Refills: 0 | Status: SHIPPED | OUTPATIENT
Start: 2021-03-11

## 2021-03-11 RX ORDER — ASPIRIN 325 MG
325 TABLET, DELAYED RELEASE (ENTERIC COATED) ORAL DAILY
Qty: 30 TABLET | Refills: 0 | Status: SHIPPED | OUTPATIENT
Start: 2021-03-11 | End: 2021-04-17

## 2021-03-11 NOTE — H&P (VIEW-ONLY)
Assessment/Plan   Diagnoses and all orders for this visit:    BRCA2 gene mutation positive  -     acetaminophen (TYLENOL) 500 mg tablet; Take 2 tablets (1,000 mg total) by mouth 3 (three) times a day  -     ibuprofen (MOTRIN) 800 mg tablet; Take 1 tablet (800 mg total) by mouth every 8 (eight) hours for 21 days  -     gabapentin (NEURONTIN) 300 mg capsule; Take 1 capsule (300 mg total) by mouth 3 (three) times a day for 7 days  -     oxyCODONE (ROXICODONE) 5 mg immediate release tablet; Take 1 tablet (5 mg total) by mouth every 6 (six) hours as needed for severe pain for up to 25 dosesMax Daily Amount: 20 mg  -     aspirin (ECOTRIN) 325 mg EC tablet; Take 1 tablet (325 mg total) by mouth daily  -     naloxone (NARCAN) 4 mg/0 1 mL nasal spray; Administer 1 spray into a nostril  If no response after 2-3 minutes, give another dose in the other nostril using a new spray  During today's 45 minute visit, all of which was dedicated to counseling, I reviewed the anticipated preoperative, intraoperative, and postoperative courses  I informed her that the nurses will contact the patient the day prior to surgery in order to discuss orientation and logistical information  I will then see her the day of surgery where I will answer any last minute questions and perform my preoperative markings  We will then proceed to the operating room for an anticipated 10 hour procedure under general anesthesia, specifically a  Bilateral Nipple Sparing Mastectomy (IMF Incision), Bilateral Immediate Breast Reconstruction with Free Abdominal Tissue Transfer  For each procedure, I reviewed the incisions/scars, duration, recovery time, postoperative restrictions, and follow-up  When applicable, I discussed hospitalization, dressing changes, drains, and donor site morbidity        All risks, benefits, and options, including but not limited to, pain, scarring, bleeding, infection, asymmetry, contour deformity, damage to adjacent nerves, vessels, and organs, hematoma, seroma, paresthesias, anesthesia (temporary versus permanent), skin necrosis, fat necrosis, flap necrosis, wound dehiscence with need for healing by secondary intention and postoperative dressing changes, hypertrophic and/or keloid scar formation, deep venous thrombosis, pulmonary embolism, recurrence, and need for revision and/or reoperation were fully explained to the patient  All questions were answered  Once full understanding of the anticipated procedure was verified, informed consent was obtained  All risks, benefits, and options, including but not limited to, change or loss in sensation of the nipple and surrounding nipple/areola complex that may be transient or permanent, scars that will be visible on cut surfaces of the breast, wound separation, infection, breast asymmetry, increased or decreased pigmentation of the skin and/or nipple-areola complex, blood loss, hematoma, seroma, and fat necrosis  The patient will have an expected six-week postoperative recovery period during which time she will have activity restrictions  Specifically, this includes, but is not limited to, avoidance of heavy lifting (nothing heavier than a gallon of milk), avoidance of strenuous activity, avoidance of any activity which makes her hurt or perspire, avoidance of anything that places tension across the incisions, avoidance of submerging the incisions or operative area in water (including bathing in a bathtub, swimming, etc ), and avoidance of dirty, roseann, or contaminated environments  A surgical bra and abdominal binder would be recommended to be worn 24/7 for 6 weeks, except when bathing or when washing  It was emphasized to the patient that postoperatively, it is mandated to employ a high-protein, low salt diet, take deep breaths in order to avoid atelectasis, and ambulate at least 5 times a day in order to avoid deep venous thrombosis and pulmonary embolism      The patient was provided prescriptions for acetaminophen, ibuprofen, gabapentin, oxycodone and Hibiclens  The purposes of each were explained and the patient will have these filled prior to the day of the operation  At the conclusion of our conversation, the patient wished to proceed with surgery  Zuleika Leija MD   [facility]   [address]   [place]el[place] Bains JACKIE Kennedykevinelise [place]   Office: [phone]      Subjective   Patient ID: Lucian Malave is a 48 y o  female  Lucian Malave is being seen today for preoperative evaluation  Since last visit, there has not been any changes in the patient's overall health status and/or surgical plan to report  Patient has a history of BRCA 2  They present preoperatively for planned: Bilateral Nipple Sparing Mastectomy (IMF Incision), Bilateral Immediate Breast Reconstruction with Free Abdominal Tissue Transfer  Scheduled for: 3/15/2021    In conjunction with: Dr Ekaterina Carmichael     The following history of N/V post operative: none    Nicotine status: none     Vitals:    03/11/21 1532   BP: 138/84   Pulse: 55   Temp: (!) 96 8 °F (36 °C)     HPI    The following portions of the patient's history were reviewed and updated as appropriate: allergies, current medications, past family history, past medical history, past social history, past surgical history and problem list     Review of Systems   Constitutional: Negative  HENT: Negative  Eyes: Negative  Respiratory: Negative  Cardiovascular: Negative  Gastrointestinal: Negative  Endocrine: Negative  Genitourinary: Negative  Musculoskeletal: Negative  Skin: Negative  Allergic/Immunologic: Negative  Neurological: Negative  Hematological: Negative  Psychiatric/Behavioral: Negative  Objective   Physical Exam   Vitals reviewed  Constitutional  She appears well-developed and well-nourished  Eyes  Pupils are equal, round, and reactive to light  Cardiovascular: Normal rate  Pulmonary/Chest  Effort normal      Psychiatric  She has a normal mood and affect  Her behavior is normal      Abdomen and Hips  Soft  Hernia confirmed negative in the ventral area  Abdominal shape is panniculus  She has abdominal striae  She has vertical, abdominal skin redundancy  Patient has excess abdominal fat  Excess fat located in the: lower abdomen and jeffrey-umbilical      Breast  Breast Measurements:   Right Left   A: Sternal notch to nipple distance (cm) 26 25 5   B: Midsternum to nipple distance (cm)     C: Midclavicle to nipple distance (cm)     D: Nipple to inframammary fold (cm) 10 11   E: Base width (cm) 17 17   F: Inframammary distance (cm) 25 5      Right Left   Areolar diameter (cm) 5 5   Nipple diameter (cm)     Superior tissue pinch test (cm)     Breast ptosis (grade 0-3) 1 1         Her breasts have normal envelope compliance  Her breasts have adequate tissue coverage  Additional breast conditions include the following:   Right Breast: She is negative for a right mass  Left Breast: She is negative for a left mass  Body mass index is 33 36 kg/m²

## 2021-03-11 NOTE — PRE-PROCEDURE INSTRUCTIONS
Pre-Surgery Instructions:   Medication Instructions    ALPRAZolam (XANAX) 0 25 mg tablet Instructed patient per Anesthesia Guidelines   CALCIUM PO Patient was instructed by Physician and understands   cetirizine (ZyrTEC) 10 mg tablet Instructed patient per Anesthesia Guidelines   cyclobenzaprine (FLEXERIL) 10 mg tablet Instructed patient per Anesthesia Guidelines   HYDROcodone-acetaminophen (NORCO) 7 5-325 mg per tablet Instructed patient per Anesthesia Guidelines   lisinopril (ZESTRIL) 10 mg tablet Instructed patient per Anesthesia Guidelines   metoprolol tartrate (LOPRESSOR) 50 mg tablet Instructed patient per Anesthesia Guidelines   Multiple Vitamin (multivitamin) capsule Patient was instructed by Physician and understands   sertraline (ZOLOFT) 50 mg tablet Instructed patient per Anesthesia Guidelines   valACYclovir (VALTREX) 1,000 mg tablet Instructed patient per Anesthesia Guidelines  Pre op and bathing instructions reviewed  Pt has hibiclens  Pt  Verbalized understanding of current visitor restrictions  Pt  Verbalized an understanding of all instructions reviewed and offers no concerns at this time  Instructed to take per normal schedule except DOS  Last dose for Lisinopril 3-13-21  DOS instructed to take metoprolol,Xanax,Zoloft wit a few sips of H2O  Instructed to avoid all ASA/NSAIDs and OTC Vit/Supp from now until after surgery   Tylenol ok prn

## 2021-03-14 ENCOUNTER — ANESTHESIA EVENT (OUTPATIENT)
Dept: PERIOP | Facility: HOSPITAL | Age: 51
DRG: 850 | End: 2021-03-14
Payer: COMMERCIAL

## 2021-03-15 ENCOUNTER — HOSPITAL ENCOUNTER (INPATIENT)
Facility: HOSPITAL | Age: 51
LOS: 4 days | Discharge: HOME HEALTH | DRG: 850 | End: 2021-03-19
Attending: SURGERY | Admitting: PLASTIC SURGERY
Payer: COMMERCIAL

## 2021-03-15 ENCOUNTER — ANESTHESIA (OUTPATIENT)
Dept: PERIOP | Facility: HOSPITAL | Age: 51
DRG: 850 | End: 2021-03-15
Payer: COMMERCIAL

## 2021-03-15 DIAGNOSIS — Z15.09 BRCA2 GENE MUTATION POSITIVE: ICD-10-CM

## 2021-03-15 DIAGNOSIS — Z15.01 BRCA2 GENE MUTATION POSITIVE: ICD-10-CM

## 2021-03-15 PROBLEM — I10 HTN (HYPERTENSION): Status: ACTIVE | Noted: 2021-03-15

## 2021-03-15 PROBLEM — F41.9 ANXIETY: Status: ACTIVE | Noted: 2021-03-15

## 2021-03-15 PROBLEM — R51.9 HEADACHE: Status: ACTIVE | Noted: 2021-03-15

## 2021-03-15 LAB — APTT PPP: 33 SECONDS (ref 23–37)

## 2021-03-15 PROCEDURE — 19303 MAST SIMPLE COMPLETE: CPT | Performed by: SURGERY

## 2021-03-15 PROCEDURE — C1781 MESH (IMPLANTABLE): HCPCS | Performed by: SURGERY

## 2021-03-15 PROCEDURE — 85730 THROMBOPLASTIN TIME PARTIAL: CPT | Performed by: NURSE ANESTHETIST, CERTIFIED REGISTERED

## 2021-03-15 PROCEDURE — 85014 HEMATOCRIT: CPT

## 2021-03-15 PROCEDURE — 88307 TISSUE EXAM BY PATHOLOGIST: CPT | Performed by: PATHOLOGY

## 2021-03-15 PROCEDURE — 19364 BRST RCNSTJ FREE FLAP: CPT | Performed by: STUDENT IN AN ORGANIZED HEALTH CARE EDUCATION/TRAINING PROGRAM

## 2021-03-15 PROCEDURE — 0HRU077 REPLACEMENT OF LEFT BREAST USING DEEP INFERIOR EPIGASTRIC ARTERY PERFORATOR FLAP, OPEN APPROACH: ICD-10-PCS | Performed by: PLASTIC SURGERY

## 2021-03-15 PROCEDURE — 82947 ASSAY GLUCOSE BLOOD QUANT: CPT

## 2021-03-15 PROCEDURE — C9290 INJ, BUPIVACAINE LIPOSOME: HCPCS | Performed by: PLASTIC SURGERY

## 2021-03-15 PROCEDURE — 82803 BLOOD GASES ANY COMBINATION: CPT

## 2021-03-15 PROCEDURE — 84295 ASSAY OF SERUM SODIUM: CPT

## 2021-03-15 PROCEDURE — 97605 NEG PRS WND THER DME<=50SQCM: CPT | Performed by: PLASTIC SURGERY

## 2021-03-15 PROCEDURE — 84132 ASSAY OF SERUM POTASSIUM: CPT

## 2021-03-15 PROCEDURE — 0HBV0ZZ EXCISION OF BILATERAL BREAST, OPEN APPROACH: ICD-10-PCS | Performed by: PLASTIC SURGERY

## 2021-03-15 PROCEDURE — 0WQF0ZZ REPAIR ABDOMINAL WALL, OPEN APPROACH: ICD-10-PCS | Performed by: PLASTIC SURGERY

## 2021-03-15 PROCEDURE — 0HRT076 REPLACEMENT OF RIGHT BREAST USING TRANSVERSE RECTUS ABDOMINIS MYOCUTANEOUS FLAP, OPEN APPROACH: ICD-10-PCS | Performed by: PLASTIC SURGERY

## 2021-03-15 PROCEDURE — 19364 BRST RCNSTJ FREE FLAP: CPT | Performed by: PLASTIC SURGERY

## 2021-03-15 DEVICE — THE FLOWCOUPLER SYSTEM CONSISTS OF A FLOWCOUPLER DEVICE AND A FLOWCOUPLER MONITOR. THE FLOWCOUPLER DEVICE INCLUDES A 20MHZ ULTRASONIC DOPPLER TRANSDUCER (PROBE) ATTACHED TO ONE OF THE FLOWCOUPLER RINGS, AND AN EXTERNAL LEAD. THE FLOWCOUPLER RINGS ARE MADE OF HIGH DENSITY POLYETHYLENE AND SURGICAL GRADE STAINLESS STEEL PINS. A PROTECTIVE COVER AND JAW ASSEMBLY PROTECT THE RINGS AND PROBE WHICH ALLOW FOR EASY LOADING ONTO THE ANASTOMOTIC INSTRUMENT. BOTH THE PROTECTIVE COVER AND JAW ASSEMBLY ARE DISPOSABLE.
Type: IMPLANTABLE DEVICE | Site: CHEST | Status: FUNCTIONAL
Brand: GEM FLOW COUPLER

## 2021-03-15 DEVICE — PHASIX MESH, 4" X 6" (10.2 CM X 15.2 CM), RECTANGLE
Type: IMPLANTABLE DEVICE | Site: ABDOMEN | Status: FUNCTIONAL
Brand: PHASIX

## 2021-03-15 RX ORDER — CLINDAMYCIN PHOSPHATE 600 MG/50ML
600 INJECTION INTRAVENOUS EVERY 8 HOURS
Status: COMPLETED | OUTPATIENT
Start: 2021-03-15 | End: 2021-03-16

## 2021-03-15 RX ORDER — EPHEDRINE SULFATE 50 MG/ML
INJECTION INTRAVENOUS AS NEEDED
Status: DISCONTINUED | OUTPATIENT
Start: 2021-03-15 | End: 2021-03-15

## 2021-03-15 RX ORDER — GABAPENTIN 300 MG/1
300 CAPSULE ORAL ONCE
Status: COMPLETED | OUTPATIENT
Start: 2021-03-15 | End: 2021-03-15

## 2021-03-15 RX ORDER — SODIUM CHLORIDE, SODIUM LACTATE, POTASSIUM CHLORIDE, CALCIUM CHLORIDE 600; 310; 30; 20 MG/100ML; MG/100ML; MG/100ML; MG/100ML
125 INJECTION, SOLUTION INTRAVENOUS CONTINUOUS
Status: DISCONTINUED | OUTPATIENT
Start: 2021-03-15 | End: 2021-03-16

## 2021-03-15 RX ORDER — GABAPENTIN 300 MG/1
300 CAPSULE ORAL 3 TIMES DAILY
Status: DISCONTINUED | OUTPATIENT
Start: 2021-03-15 | End: 2021-03-19 | Stop reason: HOSPADM

## 2021-03-15 RX ORDER — ROCURONIUM BROMIDE 10 MG/ML
INJECTION, SOLUTION INTRAVENOUS AS NEEDED
Status: DISCONTINUED | OUTPATIENT
Start: 2021-03-15 | End: 2021-03-15

## 2021-03-15 RX ORDER — FENTANYL CITRATE 50 UG/ML
INJECTION, SOLUTION INTRAMUSCULAR; INTRAVENOUS AS NEEDED
Status: DISCONTINUED | OUTPATIENT
Start: 2021-03-15 | End: 2021-03-15

## 2021-03-15 RX ORDER — OXYCODONE HYDROCHLORIDE 5 MG/1
5 TABLET ORAL EVERY 4 HOURS PRN
Status: DISCONTINUED | OUTPATIENT
Start: 2021-03-15 | End: 2021-03-19 | Stop reason: HOSPADM

## 2021-03-15 RX ORDER — HYDROMORPHONE HCL/PF 1 MG/ML
0.5 SYRINGE (ML) INJECTION EVERY 4 HOURS PRN
Status: DISCONTINUED | OUTPATIENT
Start: 2021-03-15 | End: 2021-03-19 | Stop reason: HOSPADM

## 2021-03-15 RX ORDER — OXYCODONE HYDROCHLORIDE 10 MG/1
10 TABLET ORAL EVERY 4 HOURS PRN
Status: DISCONTINUED | OUTPATIENT
Start: 2021-03-15 | End: 2021-03-19 | Stop reason: HOSPADM

## 2021-03-15 RX ORDER — LIDOCAINE HYDROCHLORIDE 10 MG/ML
INJECTION, SOLUTION EPIDURAL; INFILTRATION; INTRACAUDAL; PERINEURAL AS NEEDED
Status: DISCONTINUED | OUTPATIENT
Start: 2021-03-15 | End: 2021-03-15

## 2021-03-15 RX ORDER — MAGNESIUM HYDROXIDE 1200 MG/15ML
LIQUID ORAL AS NEEDED
Status: DISCONTINUED | OUTPATIENT
Start: 2021-03-15 | End: 2021-03-15 | Stop reason: HOSPADM

## 2021-03-15 RX ORDER — CEFAZOLIN SODIUM 2 G/50ML
2000 SOLUTION INTRAVENOUS ONCE
Status: COMPLETED | OUTPATIENT
Start: 2021-03-15 | End: 2021-03-15

## 2021-03-15 RX ORDER — ACETAMINOPHEN 325 MG/1
975 TABLET ORAL EVERY 8 HOURS
Status: DISCONTINUED | OUTPATIENT
Start: 2021-03-15 | End: 2021-03-19 | Stop reason: HOSPADM

## 2021-03-15 RX ORDER — ASPIRIN 325 MG
325 TABLET ORAL DAILY
Status: DISCONTINUED | OUTPATIENT
Start: 2021-03-16 | End: 2021-03-19 | Stop reason: HOSPADM

## 2021-03-15 RX ORDER — AMOXICILLIN 250 MG
1 CAPSULE ORAL
Status: DISCONTINUED | OUTPATIENT
Start: 2021-03-15 | End: 2021-03-19 | Stop reason: HOSPADM

## 2021-03-15 RX ORDER — LISINOPRIL 10 MG/1
10 TABLET ORAL EVERY MORNING
Status: DISCONTINUED | OUTPATIENT
Start: 2021-03-16 | End: 2021-03-19 | Stop reason: HOSPADM

## 2021-03-15 RX ORDER — HEPARIN SODIUM 1000 [USP'U]/ML
INJECTION, SOLUTION INTRAVENOUS; SUBCUTANEOUS AS NEEDED
Status: DISCONTINUED | OUTPATIENT
Start: 2021-03-15 | End: 2021-03-15

## 2021-03-15 RX ORDER — DEXAMETHASONE SODIUM PHOSPHATE 10 MG/ML
INJECTION, SOLUTION INTRAMUSCULAR; INTRAVENOUS AS NEEDED
Status: DISCONTINUED | OUTPATIENT
Start: 2021-03-15 | End: 2021-03-15

## 2021-03-15 RX ORDER — ONDANSETRON 2 MG/ML
INJECTION INTRAMUSCULAR; INTRAVENOUS AS NEEDED
Status: DISCONTINUED | OUTPATIENT
Start: 2021-03-15 | End: 2021-03-15

## 2021-03-15 RX ORDER — PROPOFOL 10 MG/ML
INJECTION, EMULSION INTRAVENOUS AS NEEDED
Status: DISCONTINUED | OUTPATIENT
Start: 2021-03-15 | End: 2021-03-15

## 2021-03-15 RX ORDER — POLYETHYLENE GLYCOL 3350 17 G/17G
17 POWDER, FOR SOLUTION ORAL ONCE AS NEEDED
Status: DISCONTINUED | OUTPATIENT
Start: 2021-03-15 | End: 2021-03-19 | Stop reason: HOSPADM

## 2021-03-15 RX ORDER — METOPROLOL TARTRATE 5 MG/5ML
INJECTION INTRAVENOUS AS NEEDED
Status: DISCONTINUED | OUTPATIENT
Start: 2021-03-15 | End: 2021-03-15

## 2021-03-15 RX ORDER — DIPHENHYDRAMINE HYDROCHLORIDE 50 MG/ML
25 INJECTION INTRAMUSCULAR; INTRAVENOUS EVERY 6 HOURS PRN
Status: DISCONTINUED | OUTPATIENT
Start: 2021-03-15 | End: 2021-03-19 | Stop reason: HOSPADM

## 2021-03-15 RX ORDER — BUPIVACAINE HYDROCHLORIDE 2.5 MG/ML
INJECTION, SOLUTION EPIDURAL; INFILTRATION; INTRACAUDAL AS NEEDED
Status: DISCONTINUED | OUTPATIENT
Start: 2021-03-15 | End: 2021-03-15

## 2021-03-15 RX ORDER — HYDROMORPHONE HCL/PF 1 MG/ML
0.5 SYRINGE (ML) INJECTION
Status: DISCONTINUED | OUTPATIENT
Start: 2021-03-15 | End: 2021-03-15 | Stop reason: HOSPADM

## 2021-03-15 RX ORDER — CYCLOBENZAPRINE HCL 10 MG
10 TABLET ORAL 3 TIMES DAILY PRN
Status: DISCONTINUED | OUTPATIENT
Start: 2021-03-15 | End: 2021-03-19 | Stop reason: HOSPADM

## 2021-03-15 RX ORDER — ALPRAZOLAM 0.25 MG/1
0.25 TABLET ORAL 2 TIMES DAILY PRN
Status: DISCONTINUED | OUTPATIENT
Start: 2021-03-15 | End: 2021-03-19 | Stop reason: HOSPADM

## 2021-03-15 RX ORDER — ONDANSETRON 2 MG/ML
4 INJECTION INTRAMUSCULAR; INTRAVENOUS ONCE AS NEEDED
Status: DISCONTINUED | OUTPATIENT
Start: 2021-03-15 | End: 2021-03-15 | Stop reason: HOSPADM

## 2021-03-15 RX ORDER — METOCLOPRAMIDE HYDROCHLORIDE 5 MG/ML
10 INJECTION INTRAMUSCULAR; INTRAVENOUS ONCE AS NEEDED
Status: DISCONTINUED | OUTPATIENT
Start: 2021-03-15 | End: 2021-03-15 | Stop reason: HOSPADM

## 2021-03-15 RX ORDER — ALBUMIN, HUMAN INJ 5% 5 %
SOLUTION INTRAVENOUS CONTINUOUS PRN
Status: DISCONTINUED | OUTPATIENT
Start: 2021-03-15 | End: 2021-03-15

## 2021-03-15 RX ORDER — IBUPROFEN 400 MG/1
800 TABLET ORAL EVERY 8 HOURS SCHEDULED
Status: DISCONTINUED | OUTPATIENT
Start: 2021-03-15 | End: 2021-03-19 | Stop reason: HOSPADM

## 2021-03-15 RX ORDER — SODIUM CHLORIDE 9 MG/ML
INJECTION, SOLUTION INTRAVENOUS CONTINUOUS PRN
Status: DISCONTINUED | OUTPATIENT
Start: 2021-03-15 | End: 2021-03-15

## 2021-03-15 RX ORDER — ACETAMINOPHEN 325 MG/1
975 TABLET ORAL ONCE
Status: COMPLETED | OUTPATIENT
Start: 2021-03-15 | End: 2021-03-15

## 2021-03-15 RX ORDER — ONDANSETRON 2 MG/ML
4 INJECTION INTRAMUSCULAR; INTRAVENOUS EVERY 6 HOURS PRN
Status: DISCONTINUED | OUTPATIENT
Start: 2021-03-15 | End: 2021-03-19 | Stop reason: HOSPADM

## 2021-03-15 RX ORDER — LORATADINE 10 MG/1
10 TABLET ORAL DAILY
Status: DISCONTINUED | OUTPATIENT
Start: 2021-03-16 | End: 2021-03-19 | Stop reason: HOSPADM

## 2021-03-15 RX ORDER — METOPROLOL TARTRATE 50 MG/1
50 TABLET, FILM COATED ORAL EVERY MORNING
Status: DISCONTINUED | OUTPATIENT
Start: 2021-03-16 | End: 2021-03-19 | Stop reason: HOSPADM

## 2021-03-15 RX ORDER — MIDAZOLAM HYDROCHLORIDE 2 MG/2ML
INJECTION, SOLUTION INTRAMUSCULAR; INTRAVENOUS AS NEEDED
Status: DISCONTINUED | OUTPATIENT
Start: 2021-03-15 | End: 2021-03-15

## 2021-03-15 RX ORDER — HEPARIN SODIUM 5000 [USP'U]/ML
5000 INJECTION, SOLUTION INTRAVENOUS; SUBCUTANEOUS ONCE
Status: COMPLETED | OUTPATIENT
Start: 2021-03-15 | End: 2021-03-15

## 2021-03-15 RX ORDER — SODIUM CHLORIDE, SODIUM LACTATE, POTASSIUM CHLORIDE, CALCIUM CHLORIDE 600; 310; 30; 20 MG/100ML; MG/100ML; MG/100ML; MG/100ML
50 INJECTION, SOLUTION INTRAVENOUS CONTINUOUS
Status: DISCONTINUED | OUTPATIENT
Start: 2021-03-15 | End: 2021-03-15

## 2021-03-15 RX ORDER — HEPARIN SODIUM 10000 [USP'U]/100ML
500 INJECTION, SOLUTION INTRAVENOUS CONTINUOUS
Status: DISCONTINUED | OUTPATIENT
Start: 2021-03-15 | End: 2021-03-15

## 2021-03-15 RX ORDER — FENTANYL CITRATE/PF 50 MCG/ML
50 SYRINGE (ML) INJECTION
Status: DISCONTINUED | OUTPATIENT
Start: 2021-03-15 | End: 2021-03-15 | Stop reason: HOSPADM

## 2021-03-15 RX ORDER — HEPARIN SODIUM 10000 [USP'U]/100ML
500 INJECTION, SOLUTION INTRAVENOUS CONTINUOUS
Status: DISCONTINUED | OUTPATIENT
Start: 2021-03-15 | End: 2021-03-16

## 2021-03-15 RX ADMIN — ROCURONIUM BROMIDE 20 MG: 10 INJECTION, SOLUTION INTRAVENOUS at 09:24

## 2021-03-15 RX ADMIN — ALBUMIN HUMAN: 0.05 INJECTION, SOLUTION INTRAVENOUS at 15:45

## 2021-03-15 RX ADMIN — EPHEDRINE SULFATE 5 MG: 50 INJECTION, SOLUTION INTRAVENOUS at 11:00

## 2021-03-15 RX ADMIN — ROCURONIUM BROMIDE 20 MG: 10 INJECTION, SOLUTION INTRAVENOUS at 18:29

## 2021-03-15 RX ADMIN — ROCURONIUM BROMIDE 20 MG: 10 INJECTION, SOLUTION INTRAVENOUS at 09:15

## 2021-03-15 RX ADMIN — BUPIVACAINE 2.5 ML: 13.3 INJECTION, SUSPENSION, LIPOSOMAL INFILTRATION at 08:09

## 2021-03-15 RX ADMIN — ONDANSETRON 4 MG: 2 INJECTION INTRAMUSCULAR; INTRAVENOUS at 19:20

## 2021-03-15 RX ADMIN — BUPIVACAINE 5 ML: 13.3 INJECTION, SUSPENSION, LIPOSOMAL INFILTRATION at 08:19

## 2021-03-15 RX ADMIN — SODIUM CHLORIDE, SODIUM LACTATE, POTASSIUM CHLORIDE, AND CALCIUM CHLORIDE 125 ML/HR: .6; .31; .03; .02 INJECTION, SOLUTION INTRAVENOUS at 21:12

## 2021-03-15 RX ADMIN — HEPARIN SODIUM 3000 UNITS: 1000 INJECTION INTRAVENOUS; SUBCUTANEOUS at 15:51

## 2021-03-15 RX ADMIN — ROCURONIUM BROMIDE 20 MG: 10 INJECTION, SOLUTION INTRAVENOUS at 14:31

## 2021-03-15 RX ADMIN — NITROGLYCERIN 1 INCH: 20 OINTMENT TOPICAL at 21:49

## 2021-03-15 RX ADMIN — FENTANYL CITRATE 50 MCG: 50 INJECTION INTRAMUSCULAR; INTRAVENOUS at 20:42

## 2021-03-15 RX ADMIN — DEXAMETHASONE SODIUM PHOSPHATE 10 MG: 10 INJECTION, SOLUTION INTRAMUSCULAR; INTRAVENOUS at 07:56

## 2021-03-15 RX ADMIN — ROCURONIUM BROMIDE 20 MG: 10 INJECTION, SOLUTION INTRAVENOUS at 10:37

## 2021-03-15 RX ADMIN — HEPARIN SODIUM 500 UNITS/HR: 10000 INJECTION, SOLUTION INTRAVENOUS at 18:45

## 2021-03-15 RX ADMIN — BUPIVACAINE 2.5 ML: 13.3 INJECTION, SUSPENSION, LIPOSOMAL INFILTRATION at 08:10

## 2021-03-15 RX ADMIN — EPHEDRINE SULFATE 5 MG: 50 INJECTION, SOLUTION INTRAVENOUS at 10:49

## 2021-03-15 RX ADMIN — ALBUMIN HUMAN: 0.05 INJECTION, SOLUTION INTRAVENOUS at 18:23

## 2021-03-15 RX ADMIN — ROCURONIUM BROMIDE 20 MG: 10 INJECTION, SOLUTION INTRAVENOUS at 16:36

## 2021-03-15 RX ADMIN — IBUPROFEN 800 MG: 400 TABLET ORAL at 21:47

## 2021-03-15 RX ADMIN — PROPOFOL 200 MG: 10 INJECTION, EMULSION INTRAVENOUS at 07:56

## 2021-03-15 RX ADMIN — LIDOCAINE HYDROCHLORIDE 50 MG: 10 INJECTION, SOLUTION EPIDURAL; INFILTRATION; INTRACAUDAL at 07:56

## 2021-03-15 RX ADMIN — ROCURONIUM BROMIDE 20 MG: 10 INJECTION, SOLUTION INTRAVENOUS at 17:42

## 2021-03-15 RX ADMIN — SODIUM CHLORIDE: 0.9 INJECTION, SOLUTION INTRAVENOUS at 11:58

## 2021-03-15 RX ADMIN — HEPARIN SODIUM 500 UNITS/HR: 10000 INJECTION, SOLUTION INTRAVENOUS at 21:13

## 2021-03-15 RX ADMIN — MIDAZOLAM HYDROCHLORIDE 2 MG: 1 INJECTION, SOLUTION INTRAMUSCULAR; INTRAVENOUS at 07:50

## 2021-03-15 RX ADMIN — ROCURONIUM BROMIDE 50 MG: 10 INJECTION, SOLUTION INTRAVENOUS at 07:56

## 2021-03-15 RX ADMIN — FENTANYL CITRATE 50 MCG: 50 INJECTION, SOLUTION INTRAMUSCULAR; INTRAVENOUS at 19:11

## 2021-03-15 RX ADMIN — BUPIVACAINE HYDROCHLORIDE 7.5 ML: 2.5 INJECTION, SOLUTION EPIDURAL; INFILTRATION; INTRACAUDAL at 08:14

## 2021-03-15 RX ADMIN — ALBUMIN HUMAN: 0.05 INJECTION, SOLUTION INTRAVENOUS at 11:00

## 2021-03-15 RX ADMIN — ROCURONIUM BROMIDE 20 MG: 10 INJECTION, SOLUTION INTRAVENOUS at 19:06

## 2021-03-15 RX ADMIN — ROCURONIUM BROMIDE 10 MG: 10 INJECTION, SOLUTION INTRAVENOUS at 12:54

## 2021-03-15 RX ADMIN — FENTANYL CITRATE 50 MCG: 50 INJECTION INTRAMUSCULAR; INTRAVENOUS at 20:32

## 2021-03-15 RX ADMIN — ACETAMINOPHEN 975 MG: 325 TABLET, FILM COATED ORAL at 21:47

## 2021-03-15 RX ADMIN — SODIUM CHLORIDE, SODIUM LACTATE, POTASSIUM CHLORIDE, AND CALCIUM CHLORIDE: .6; .31; .03; .02 INJECTION, SOLUTION INTRAVENOUS at 07:50

## 2021-03-15 RX ADMIN — CEFAZOLIN SODIUM 2000 MG: 2 SOLUTION INTRAVENOUS at 08:28

## 2021-03-15 RX ADMIN — HEPARIN SODIUM 5000 UNITS: 5000 INJECTION INTRAVENOUS; SUBCUTANEOUS at 07:13

## 2021-03-15 RX ADMIN — ALBUMIN HUMAN: 0.05 INJECTION, SOLUTION INTRAVENOUS at 12:00

## 2021-03-15 RX ADMIN — ACETAMINOPHEN 975 MG: 325 TABLET, FILM COATED ORAL at 07:13

## 2021-03-15 RX ADMIN — SODIUM CHLORIDE: 0.9 INJECTION, SOLUTION INTRAVENOUS at 08:00

## 2021-03-15 RX ADMIN — DEXMEDETOMIDINE HYDROCHLORIDE 0.2 MCG/KG/HR: 100 INJECTION, SOLUTION INTRAVENOUS at 08:04

## 2021-03-15 RX ADMIN — CEFAZOLIN SODIUM 2000 MG: 2 SOLUTION INTRAVENOUS at 16:25

## 2021-03-15 RX ADMIN — GABAPENTIN 300 MG: 300 CAPSULE ORAL at 07:13

## 2021-03-15 RX ADMIN — BUPIVACAINE HYDROCHLORIDE 7.5 ML: 2.5 INJECTION, SOLUTION EPIDURAL; INFILTRATION; INTRACAUDAL at 08:13

## 2021-03-15 RX ADMIN — ROCURONIUM BROMIDE 20 MG: 10 INJECTION, SOLUTION INTRAVENOUS at 11:49

## 2021-03-15 RX ADMIN — BUPIVACAINE 2.5 ML: 13.3 INJECTION, SUSPENSION, LIPOSOMAL INFILTRATION at 08:14

## 2021-03-15 RX ADMIN — METOPROLOL TARTRATE 2.5 MG: 5 INJECTION, SOLUTION INTRAVENOUS at 09:08

## 2021-03-15 RX ADMIN — FENTANYL CITRATE 50 MCG: 50 INJECTION, SOLUTION INTRAMUSCULAR; INTRAVENOUS at 09:04

## 2021-03-15 RX ADMIN — FENTANYL CITRATE 100 MCG: 50 INJECTION, SOLUTION INTRAMUSCULAR; INTRAVENOUS at 07:56

## 2021-03-15 RX ADMIN — FENTANYL CITRATE 25 MCG: 50 INJECTION, SOLUTION INTRAMUSCULAR; INTRAVENOUS at 19:49

## 2021-03-15 RX ADMIN — CEFAZOLIN SODIUM 2000 MG: 2 SOLUTION INTRAVENOUS at 12:28

## 2021-03-15 RX ADMIN — KETAMINE HYDROCHLORIDE 0.2 MG/KG/HR: 50 INJECTION INTRAMUSCULAR; INTRAVENOUS at 08:04

## 2021-03-15 RX ADMIN — BUPIVACAINE 5 ML: 13.3 INJECTION, SUSPENSION, LIPOSOMAL INFILTRATION at 08:21

## 2021-03-15 RX ADMIN — CLINDAMYCIN IN 5 PERCENT DEXTROSE 600 MG: 12 INJECTION, SOLUTION INTRAVENOUS at 23:23

## 2021-03-15 RX ADMIN — SODIUM CHLORIDE, SODIUM LACTATE, POTASSIUM CHLORIDE, AND CALCIUM CHLORIDE: .6; .31; .03; .02 INJECTION, SOLUTION INTRAVENOUS at 11:48

## 2021-03-15 RX ADMIN — EPHEDRINE SULFATE 5 MG: 50 INJECTION, SOLUTION INTRAVENOUS at 09:46

## 2021-03-15 RX ADMIN — BUPIVACAINE 2.5 ML: 13.3 INJECTION, SUSPENSION, LIPOSOMAL INFILTRATION at 08:13

## 2021-03-15 RX ADMIN — BUPIVACAINE HYDROCHLORIDE 7.5 ML: 2.5 INJECTION, SOLUTION EPIDURAL; INFILTRATION; INTRACAUDAL at 08:10

## 2021-03-15 RX ADMIN — BUPIVACAINE HYDROCHLORIDE 10 ML: 2.5 INJECTION, SOLUTION EPIDURAL; INFILTRATION; INTRACAUDAL at 08:21

## 2021-03-15 RX ADMIN — ROCURONIUM BROMIDE 10 MG: 10 INJECTION, SOLUTION INTRAVENOUS at 14:02

## 2021-03-15 RX ADMIN — ALBUMIN HUMAN: 0.05 INJECTION, SOLUTION INTRAVENOUS at 09:47

## 2021-03-15 RX ADMIN — FENTANYL CITRATE 50 MCG: 50 INJECTION, SOLUTION INTRAMUSCULAR; INTRAVENOUS at 09:00

## 2021-03-15 RX ADMIN — OXYCODONE HYDROCHLORIDE 10 MG: 10 TABLET ORAL at 23:22

## 2021-03-15 RX ADMIN — FENTANYL CITRATE 25 MCG: 50 INJECTION, SOLUTION INTRAMUSCULAR; INTRAVENOUS at 20:09

## 2021-03-15 RX ADMIN — BUPIVACAINE HYDROCHLORIDE 7.5 ML: 2.5 INJECTION, SOLUTION EPIDURAL; INFILTRATION; INTRACAUDAL at 08:09

## 2021-03-15 RX ADMIN — BUPIVACAINE HYDROCHLORIDE 10 ML: 2.5 INJECTION, SOLUTION EPIDURAL; INFILTRATION; INTRACAUDAL at 08:19

## 2021-03-15 RX ADMIN — HYDROMORPHONE HYDROCHLORIDE 0.5 MG: 1 INJECTION, SOLUTION INTRAMUSCULAR; INTRAVENOUS; SUBCUTANEOUS at 21:44

## 2021-03-15 RX ADMIN — ROCURONIUM BROMIDE 20 MG: 10 INJECTION, SOLUTION INTRAVENOUS at 15:26

## 2021-03-15 RX ADMIN — SUGAMMADEX 353 MG: 100 INJECTION, SOLUTION INTRAVENOUS at 19:26

## 2021-03-15 RX ADMIN — GABAPENTIN 300 MG: 300 CAPSULE ORAL at 21:47

## 2021-03-15 NOTE — PROGRESS NOTES
Bilateral TAP Blocks    Time out preformed  Patient and consent verified  Bilaterality verified  Patient under general anesthesia on standard ASA monitors  Patient prepped in sterile fashion  Needle advanced under ultrasound guidance  15 mL of a mixture of 10 cc of 0 25 Bupivacaine and 5 cc of Exparel injected after negative aspiration in 5 cc increments for right TAP block  Image stored  Patient prepped in sterile fashion  Needle a dvanced under ultrasound guidance  15 mL of a mixture of 10 cc of 0 25 Bupivacaine and 5 cc of Exparel injected after negative aspiration in 5 cc increments for left TAP block  Image stored  No complications apparent  VSS

## 2021-03-15 NOTE — PROGRESS NOTES
Bilateral PECS I and II Blocks:    Time out called  Patient and bilaterality confirmed  Patient prepped in sterile fashion  Patient on standard ASA monitors under general anesthesia  Needle advanced under ultrasound guidance  10 mL of a mixture of 15 cc of 0 25% Bupivacaine and 5cc of Exparel 1 33% injected after negative aspiration in 5 cc increments for right PECS II block  Needle retracted  10 mL of a mixture of 15 cc of 0 25% Bu pivacaine and 5cc of Exparel 1 33% injected after negative aspiration in 5 cc increments for right PECS I block  Images stored  Patient prepped in sterile fashion  Needle advanced under ultrasound guidance  10 mL of a mixture of 15 cc of 0 25% Bupivacaine and 5cc of Exparel 1 33% injected after negative aspiration in 5 cc increments for left PECS II block  Needle retracted  10 mL of a mixture of 15 cc of 0 25% Bupivacaine and 5c c of Exparel 1 33% injected after negative aspiration in 5 cc increments for left PECS I block  Images stored  No complications apparent  VSS

## 2021-03-15 NOTE — INTERVAL H&P NOTE
H&P reviewed  After examining the patient I find no changes in the patients condition since the H&P had been written  Pt has elected for inframammary fold incision  All questions answered      Vitals:    03/15/21 0701   BP: 146/84   Pulse: 56   Resp: 18   Temp: (!) 97 °F (36 1 °C)   SpO2: 97%

## 2021-03-15 NOTE — ANESTHESIA PROCEDURE NOTES
Peripheral Block    Patient location during procedure: OR  Start time: 3/15/2021 8:21 AM  Reason for block: at surgeon's request and post-op pain management  Staffing  Anesthesiologist: Dee Cruz MD  Performed: anesthesiologist   Preanesthetic Checklist  Completed: patient identified, site marked, surgical consent, pre-op evaluation, timeout performed, IV checked, risks and benefits discussed and monitors and equipment checked  Peripheral Block  Patient position: supine  Prep: ChloraPrep  Patient monitoring: continuous pulse ox and frequent blood pressure checks  Anesthesia block type: PECs I and II  Laterality: bilateral  Injection technique: single-shot  Procedures: ultrasound guided, Ultrasound guidance required for the procedure to increase accuracy and safety of medication placement and decrease risk of complications    Ultrasound permanent image saved  Needle  Needle type: Stimuplex   Needle gauge: 21   Needle length: 10 cm  Needle localization: ultrasound guidance  Needle insertion depth: 3 cm  Test dose: negative  Assessment  Injection assessment: incremental injection, local visualized surrounding nerve on ultrasound and negative aspiration for heme  Paresthesia pain: none  Heart rate change: no  Slow fractionated injection: yes  Post-procedure:  site cleaned  patient tolerated the procedure well with no immediate complications

## 2021-03-15 NOTE — ANESTHESIA PREPROCEDURE EVALUATION
Procedure:  BREAST NIPPLE SPARING MATECTOMY (Bilateral Breast)  BREAST IMMEDIATE RECONSTRUCTION WITH FLAP FREE DEEP INFERIOR EPIGASTRIC  (JAMES); EXCISION RIB (Bilateral Back)  PREVENA PLACEMENT (N/A Abdomen)    Relevant Problems   CARDIO   (+) HTN (hypertension)      NEURO/PSYCH   (+) Anxiety   (+) Headache      Other   (+) BRCA2 gene mutation positive        Physical Exam    Airway    Mallampati score: II  TM Distance: >3 FB  Neck ROM: full     Dental   No notable dental hx     Cardiovascular  Rhythm: regular, Rate: normal, Cardiovascular exam normal    Pulmonary  Pulmonary exam normal     Other Findings        Anesthesia Plan  ASA Score- 2     Anesthesia Type- general and regional with ASA Monitors  Additional Monitors:   Airway Plan: ETT  Plan Factors-Exercise tolerance (METS): >4 METS  Chart reviewed  Existing labs reviewed  Patient summary reviewed  Patient is not a current smoker  Patient did not smoke on day of surgery  Induction- intravenous  Postoperative Plan- Plan for postoperative opioid use  Planned trial extubation    Informed Consent- Anesthetic plan and risks discussed with patient  I personally reviewed this patient with the CRNA  Discussed and agreed on the Anesthesia Plan with the CRNA  Sally Warner

## 2021-03-15 NOTE — OP NOTE
OPERATIVE REPORT  PATIENT NAME: Mack Quintanilla    :  1970  MRN: [id number]  Pt Location: AN OR ROOM 03    SURGERY DATE: 3/15/2021    Surgeon(s) and Role:  Panel 1:     * Sourav See MD - Primary     * Mary Mobley MD - Assisting  Panel 2:     * Jacob Orozco MD - Primary     * Raymundo Newman PA-C - Assisting     * Silvano Rosas MD - Assisting     * Vianne Hashimoto, MD    Preop Diagnosis:  BRCA2 gene mutation positive [, ]    Post-Op Diagnosis Codes:     * BRCA2 gene mutation positive [, ]    Procedure(s) (LRB):  BREAST NIPPLE SPARING MATECTOMY (Bilateral)  BREAST IMMEDIATE RECONSTRUCTION WITH FLAP FREE DEEP INFERIOR EPIGASTRIC  (JAMES); EXCISION RIB (Bilateral)  PREVENA PLACEMENT (N/A)    Specimen(s):  ID Type Source Tests Collected by Time Destination   1 : Right breast: long suture marks lateral, medium suture marks medial, short suture marks superior Tissue Breast, Right TISSUE EXAM Sourav See MD 3/15/2021 4064        Estimated Blood Loss:   50 mL    Drains:  Urethral Catheter Temperature probe;Straight-tip;Latex 16 Fr  (Active)   Number of days: 0       Anesthesia Type:   General    Operative Indications:  BRCA2 gene mutation positive [, ]      Operative Findings:  Normal anatomy    Complications:   None    Procedure and Technique:  The patient was brought to the operation room and placed under general anesthesia   Attention was paid to ensure appropriate padding and correct positioning  Dr Denzel Vazquez and I initiated a time-out, identifying the patient, the correct sides and the above procedure   All parties agreed with the time out      Surgery was started on the right side   The incision in the inframamary fold was sharply incised   Thin flaps were then elevated using electrocautery starting medially and laterally and then continuing underneath the nipple and the superiorly    The tail of Paula Cherry was then dissected away from the axilla proper leaving the breast tethered to the underlying musculature    The breast was then removed from the muscles in a sub-facial plane   The breast was oriented with sutures (short=superior; medium=medial; long=lateral)  The breast was sent to pathology in formalin for permanent pathologic evaluation  Meticulous hemostasis was maintained with electrocautery   The wound was extensively irrigated and packed with a single lap sponge  Attention was then turned to the left side  The left incision in the inframamary fold was sharply incised   Thin flaps were then elevated using electrocautery starting medially and laterally and then continuing underneath the nipple and the superiorly  The tail of Carmela Meyer was then dissected away from the axilla proper leaving the breast tethered to the underlying musculature    The breast was then removed from the muscles in a sub-facial plane   The breast was oriented with sutures (short=superior; medium=medial; long=lateral)  The breast was sent to pathology in formalin for permanent pathologic evaluation  Meticulous hemostasis was maintained with electrocautery   The wound was extensively irrigated and packed with a single lap sponge  There were no abnormal findings intraoperatively  The counts were correct x2         I was present for the entire procedure    Patient Disposition:  PACU     SIGNATURE: Luke Cash MD  DATE: March 15, 2021  TIME: 10:47 AM

## 2021-03-15 NOTE — ANESTHESIA PROCEDURE NOTES
Peripheral Block    Patient location during procedure: OR  Start time: 3/15/2021 8:14 AM  Reason for block: at surgeon's request and post-op pain management  Staffing  Anesthesiologist: Yumiko Miles MD  Performed: anesthesiologist   Preanesthetic Checklist  Completed: patient identified, site marked, surgical consent, pre-op evaluation, timeout performed, IV checked, risks and benefits discussed and monitors and equipment checked  Peripheral Block  Patient position: supine  Prep: ChloraPrep  Patient monitoring: heart rate, cardiac monitor, continuous pulse ox and frequent blood pressure checks  Block type: TAP  Laterality: bilateral  Injection technique: single-shot  Procedures: ultrasound guided, Ultrasound guidance required for the procedure to increase accuracy and safety of medication placement and decrease risk of complications  Ultrasound permanent image saved  Needle  Needle type: Stimuplex   Needle gauge: 21g    Needle length: 10 cm  Needle localization: ultrasound guidance  Needle insertion depth: 4 cm  Test dose: negative  Assessment  Injection assessment: incremental injection, local visualized surrounding nerve on ultrasound and negative aspiration for heme  Paresthesia pain: none  Heart rate change: no  Slow fractionated injection: yes  Post-procedure:  site cleaned  patient tolerated the procedure well with no immediate complications

## 2021-03-15 NOTE — INTERVAL H&P NOTE
H&P reviewed  After examining the patient I find no changes in the patients condition since the H&P had been written      Vitals:    03/15/21 0701   BP: 146/84   Pulse: 56   Resp: 18   Temp: (!) 97 °F (36 1 °C)   SpO2: 97%

## 2021-03-15 NOTE — ANESTHESIA PROCEDURE NOTES
Arterial Line Insertion  Performed by: Fran Pedro CRNA  Authorized by: Aleida Reid MD   Consent: Verbal consent obtained  Written consent obtained  Risks and benefits: risks, benefits and alternatives were discussed  Consent given by: patient  Patient understanding: patient states understanding of the procedure being performed  Patient consent: the patient's understanding of the procedure matches consent given  Procedure consent: procedure consent matches procedure scheduled  Relevant documents: relevant documents present and verified  Required items: required blood products, implants, devices, and special equipment available  Patient identity confirmed: verbally with patient and arm band  Preparation: Patient was prepped and draped in the usual sterile fashion  Indications: hemodynamic monitoring  Orientation:  Left  Location: radial artery  Sedation:  Patient sedated: geta      Procedure Details:  Andi's test normal: yes  Needle gauge: 20  Number of attempts: 1    Post-procedure:  Post-procedure: dressing applied  Waveform: good waveform and waveform confirmed  Post-procedure CNS: normal and unchanged  Patient tolerance: patient tolerated the procedure well with no immediate complications

## 2021-03-15 NOTE — INTERIM OP NOTE
BREAST NIPPLE SPARING MATECTOMY  Postoperative Note  PATIENT NAME: Andrew Nash  : 1970  MRN: [id number]  [facility] OR ROOM 03    Surgery Date: 3/15/2021    Preop Diagnosis:  BRCA2 gene mutation positive [Z15 01, Z15 09]    Post-Op Diagnosis Codes:     * BRCA2 gene mutation positive [Z15 01, Z15 09]    Procedure(s) (LRB):  BREAST NIPPLE SPARING MATECTOMY (Bilateral)  BREAST IMMEDIATE RECONSTRUCTION WITH FLAP FREE DEEP INFERIOR EPIGASTRIC  (JAMES); EXCISION RIB (Bilateral)  PREVENA PLACEMENT (N/A)    Surgeon(s) and Role:  Panel 1:     * Ana Paula Dial MD - Primary     * Rossana Herrera MD - Assisting  Panel 2:     * Jami Vieyra MD - Primary     * Ronni Lebron PA-C - Assisting     * Shalom Nolen MD - Assisting     * Marlene Toure MD    Specimens:  ID Type Source Tests Collected by Time Destination   1 : Right breast: long suture marks lateral, medium suture marks medial, short suture marks superior Tissue Breast, Right TISSUE EXAM Ana Paula Dial MD 3/15/2021 6150    2 : Left breast: long suture marks lateral, medium suture marks medial, short suture marks superior Tissue Breast, Left TISSUE EXAM Ana Paula Dial MD 3/15/2021 1042        Estimated Blood Loss:   200 mL    Anesthesia Type:   General     Findings:   Bilateral absence of breast after mastectomy    Complications:   None    SIGNATURE: Shalom Nolen MD   DATE: March 15, 2021   TIME: 7:52 PM

## 2021-03-16 LAB
ANION GAP SERPL CALCULATED.3IONS-SCNC: 10 MMOL/L (ref 4–13)
APTT PPP: 96 SECONDS (ref 23–37)
BASE EXCESS BLDA CALC-SCNC: -3 MMOL/L (ref -2–3)
BASE EXCESS BLDA CALC-SCNC: -4 MMOL/L (ref -2–3)
BASE EXCESS BLDA CALC-SCNC: -4 MMOL/L (ref -2–3)
BASE EXCESS BLDA CALC-SCNC: -5 MMOL/L (ref -2–3)
BUN SERPL-MCNC: 12 MG/DL (ref 5–25)
CALCIUM SERPL-MCNC: 7.8 MG/DL (ref 8.3–10.1)
CHLORIDE SERPL-SCNC: 105 MMOL/L (ref 100–108)
CO2 SERPL-SCNC: 25 MMOL/L (ref 21–32)
CREAT SERPL-MCNC: 0.8 MG/DL (ref 0.6–1.3)
ERYTHROCYTE [DISTWIDTH] IN BLOOD BY AUTOMATED COUNT: 12.6 % (ref 11.6–15.1)
GFR SERPL CREATININE-BSD FRML MDRD: 86 ML/MIN/1.73SQ M
GLUCOSE SERPL-MCNC: 127 MG/DL (ref 65–140)
GLUCOSE SERPL-MCNC: 127 MG/DL (ref 65–140)
GLUCOSE SERPL-MCNC: 140 MG/DL (ref 65–140)
GLUCOSE SERPL-MCNC: 141 MG/DL (ref 65–140)
GLUCOSE SERPL-MCNC: 86 MG/DL (ref 65–140)
HCO3 BLDA-SCNC: 19.4 MMOL/L (ref 22–28)
HCO3 BLDA-SCNC: 20.6 MMOL/L (ref 22–28)
HCO3 BLDA-SCNC: 21.3 MMOL/L (ref 22–28)
HCO3 BLDA-SCNC: 21.6 MMOL/L (ref 22–28)
HCT VFR BLD AUTO: 28.9 % (ref 34.8–46.1)
HCT VFR BLD CALC: 25 % (ref 34.8–46.1)
HCT VFR BLD CALC: 27 % (ref 34.8–46.1)
HCT VFR BLD CALC: 28 % (ref 34.8–46.1)
HCT VFR BLD CALC: 30 % (ref 34.8–46.1)
HGB BLD-MCNC: 9.6 G/DL (ref 11.5–15.4)
HGB BLDA-MCNC: 10.2 G/DL (ref 11.5–15.4)
HGB BLDA-MCNC: 8.5 G/DL (ref 11.5–15.4)
HGB BLDA-MCNC: 9.2 G/DL (ref 11.5–15.4)
HGB BLDA-MCNC: 9.5 G/DL (ref 11.5–15.4)
MAGNESIUM SERPL-MCNC: 1.5 MG/DL (ref 1.6–2.6)
MCH RBC QN AUTO: 31.5 PG (ref 26.8–34.3)
MCHC RBC AUTO-ENTMCNC: 33.2 G/DL (ref 31.4–37.4)
MCV RBC AUTO: 95 FL (ref 82–98)
PCO2 BLD: 20 MMOL/L (ref 21–32)
PCO2 BLD: 22 MMOL/L (ref 21–32)
PCO2 BLD: 22 MMOL/L (ref 21–32)
PCO2 BLD: 23 MMOL/L (ref 21–32)
PCO2 BLD: 33.1 MM HG (ref 36–44)
PCO2 BLD: 33.3 MM HG (ref 36–44)
PCO2 BLD: 35.5 MM HG (ref 36–44)
PCO2 BLD: 39.6 MM HG (ref 36–44)
PH BLD: 7.34 [PH] (ref 7.35–7.45)
PH BLD: 7.37 [PH] (ref 7.35–7.45)
PH BLD: 7.38 [PH] (ref 7.35–7.45)
PH BLD: 7.41 [PH] (ref 7.35–7.45)
PLATELET # BLD AUTO: 226 THOUSANDS/UL (ref 149–390)
PMV BLD AUTO: 9.9 FL (ref 8.9–12.7)
PO2 BLD: 103 MM HG (ref 75–129)
PO2 BLD: 117 MM HG (ref 75–129)
PO2 BLD: 124 MM HG (ref 75–129)
PO2 BLD: 167 MM HG (ref 75–129)
POTASSIUM BLD-SCNC: 2.9 MMOL/L (ref 3.5–5.3)
POTASSIUM BLD-SCNC: 3.6 MMOL/L (ref 3.5–5.3)
POTASSIUM SERPL-SCNC: 3.5 MMOL/L (ref 3.5–5.3)
RBC # BLD AUTO: 3.05 MILLION/UL (ref 3.81–5.12)
SAO2 % BLD FROM PO2: 100 % (ref 60–85)
SAO2 % BLD FROM PO2: 98 % (ref 60–85)
SAO2 % BLD FROM PO2: 98 % (ref 60–85)
SAO2 % BLD FROM PO2: 99 % (ref 60–85)
SODIUM BLD-SCNC: 141 MMOL/L (ref 136–145)
SODIUM BLD-SCNC: 142 MMOL/L (ref 136–145)
SODIUM BLD-SCNC: 143 MMOL/L (ref 136–145)
SODIUM BLD-SCNC: 144 MMOL/L (ref 136–145)
SODIUM SERPL-SCNC: 140 MMOL/L (ref 136–145)
SPECIMEN SOURCE: ABNORMAL
WBC # BLD AUTO: 9.69 THOUSAND/UL (ref 4.31–10.16)

## 2021-03-16 PROCEDURE — 85027 COMPLETE CBC AUTOMATED: CPT | Performed by: PLASTIC SURGERY

## 2021-03-16 PROCEDURE — 99024 POSTOP FOLLOW-UP VISIT: CPT | Performed by: PLASTIC SURGERY

## 2021-03-16 PROCEDURE — 85730 THROMBOPLASTIN TIME PARTIAL: CPT | Performed by: PLASTIC SURGERY

## 2021-03-16 PROCEDURE — 99024 POSTOP FOLLOW-UP VISIT: CPT | Performed by: SURGERY

## 2021-03-16 PROCEDURE — 83735 ASSAY OF MAGNESIUM: CPT | Performed by: PLASTIC SURGERY

## 2021-03-16 PROCEDURE — 80048 BASIC METABOLIC PNL TOTAL CA: CPT | Performed by: PLASTIC SURGERY

## 2021-03-16 RX ORDER — MAGNESIUM SULFATE HEPTAHYDRATE 40 MG/ML
2 INJECTION, SOLUTION INTRAVENOUS ONCE
Status: COMPLETED | OUTPATIENT
Start: 2021-03-16 | End: 2021-03-16

## 2021-03-16 RX ADMIN — OXYCODONE 5 MG: 5 TABLET ORAL at 17:24

## 2021-03-16 RX ADMIN — OXYCODONE 5 MG: 5 TABLET ORAL at 14:05

## 2021-03-16 RX ADMIN — SENNOSIDES AND DOCUSATE SODIUM 1 TABLET: 8.6; 5 TABLET ORAL at 21:08

## 2021-03-16 RX ADMIN — OXYCODONE 5 MG: 5 TABLET ORAL at 06:15

## 2021-03-16 RX ADMIN — ASPIRIN 325 MG ORAL TABLET 325 MG: 325 PILL ORAL at 08:15

## 2021-03-16 RX ADMIN — SERTRALINE HYDROCHLORIDE 50 MG: 50 TABLET ORAL at 00:19

## 2021-03-16 RX ADMIN — GABAPENTIN 300 MG: 300 CAPSULE ORAL at 17:24

## 2021-03-16 RX ADMIN — CLINDAMYCIN IN 5 PERCENT DEXTROSE 600 MG: 12 INJECTION, SOLUTION INTRAVENOUS at 06:14

## 2021-03-16 RX ADMIN — OXYCODONE 5 MG: 5 TABLET ORAL at 00:19

## 2021-03-16 RX ADMIN — SODIUM CHLORIDE, SODIUM LACTATE, POTASSIUM CHLORIDE, AND CALCIUM CHLORIDE 125 ML/HR: .6; .31; .03; .02 INJECTION, SOLUTION INTRAVENOUS at 06:15

## 2021-03-16 RX ADMIN — IBUPROFEN 800 MG: 400 TABLET ORAL at 21:08

## 2021-03-16 RX ADMIN — ACETAMINOPHEN 975 MG: 325 TABLET, FILM COATED ORAL at 13:16

## 2021-03-16 RX ADMIN — GABAPENTIN 300 MG: 300 CAPSULE ORAL at 21:08

## 2021-03-16 RX ADMIN — LISINOPRIL 10 MG: 10 TABLET ORAL at 08:16

## 2021-03-16 RX ADMIN — MAGNESIUM SULFATE HEPTAHYDRATE 2 G: 40 INJECTION, SOLUTION INTRAVENOUS at 09:01

## 2021-03-16 RX ADMIN — IBUPROFEN 800 MG: 400 TABLET ORAL at 13:16

## 2021-03-16 RX ADMIN — METOPROLOL TARTRATE 50 MG: 50 TABLET, FILM COATED ORAL at 09:10

## 2021-03-16 RX ADMIN — GABAPENTIN 300 MG: 300 CAPSULE ORAL at 08:15

## 2021-03-16 RX ADMIN — LORATADINE 10 MG: 10 TABLET ORAL at 08:15

## 2021-03-16 RX ADMIN — ACETAMINOPHEN 975 MG: 325 TABLET, FILM COATED ORAL at 06:15

## 2021-03-16 RX ADMIN — SERTRALINE HYDROCHLORIDE 50 MG: 50 TABLET ORAL at 08:15

## 2021-03-16 RX ADMIN — IBUPROFEN 800 MG: 400 TABLET ORAL at 06:15

## 2021-03-16 RX ADMIN — ENOXAPARIN SODIUM 90 MG: 100 INJECTION SUBCUTANEOUS at 17:24

## 2021-03-16 RX ADMIN — HYDROMORPHONE HYDROCHLORIDE 0.5 MG: 1 INJECTION, SOLUTION INTRAMUSCULAR; INTRAVENOUS; SUBCUTANEOUS at 04:32

## 2021-03-16 RX ADMIN — ACETAMINOPHEN 975 MG: 325 TABLET, FILM COATED ORAL at 21:09

## 2021-03-16 NOTE — QUICK NOTE
Post-Op Check    Assessment: 48 y o  F s/p bilateral nipple sparing mastectomy with immediate reconstruction with bilateral JAMES flaps    Plan:  Clear liquid diet  [email or web address]  Maintain THERON's  Continue Heparin drip  Continue frequent flap checks with dopplers and   Oximeter replaced given poor signal, functioning better, continue to monitor  Follow up final path  Rest of care per ICU    Subjective:  Patient reports some pain in bilateral breasts  Tolerating sips of clears with no nausea or vomiting   No other complaints    Vitals:    03/15/21 2200   BP:    Pulse:    Resp: 18   Temp:    SpO2:        PE:  Gen: NAD, AAOx3  CV: RRR  Pulm: no resp distress  Breast: Incisions c/d/i, Bilateral THERON's with sanguinous output, Good doppler signals,  with good signals A>B, Oximeter reading 36% on left and 76% on right  Abd: Soft, non-distended, non-tender, incisions c/d/i  Keyanna Miller MD  General Surgery, PGY1

## 2021-03-16 NOTE — UTILIZATION REVIEW
Initial Clinical Review  IP 19303 X2 [id number] x 3  [id number] Rayo Calderon # [id number] VALID: 3/15/2021 PER AIDEN @ [facility] [phone]  Elective Inpatient  surgical procedure  Age/Sex: 48 y o  female  Surgery Date: 3/15/2021  Procedure: BREAST NIPPLE SPARING MATECTOMY (Bilateral)  BREAST IMMEDIATE RECONSTRUCTION WITH FLAP FREE DEEP INFERIOR EPIGASTRIC  (JAMES); EXCISION RIB (Bilateral)  PREVENA PLACEMENT (N/A)  Anesthesia: general   Operative Findings: Normal anatomy, PLASTICS: Right MS2- TRAM anastomosed to left chest   Left JAMES flap anastomosed to right chest  POD#1 Progress Note:   3/16/2021 early AM note Resident GEN Surgery  s/p bilateral nipple sparing mastectomy with immediate reconstruction with bilateral JAMES flaps  Plan:  Clear liquid diet  [email or web address]  Maintain THERON's  Continue Heparin drip  Continue frequent flap checks with dopplers and   Oximeter replaced given poor signal, functioning better, continue to monitor  Follow up final path  Rest of care per ICU, Patient reports some pain in bilateral breasts  Tolerating sips of clears with no nausea or vomiting  No other complaints    3/16/2021 AM NOTE: Resident GEN Surgery s/p bilateral nipple sparing mastectomy with immediate reconstruction with bilateral JAMES flaps  Had a migraine last night that resolved with her home zoloft, tolerated a CLD with no nausea or vomiting, having pain in abdomen but minimal in breasts, not yet having bowel function  Plan:  Advance diet as tolerated  [email or web address], can d/c when tolerating sufficient PO  Heparin drip per plastics  Continue frequent flap checks with dopplers and   Continue continuous tissue oximetry  Follow up final path  Rest of care per ICU and plastics  3/16/2021 PLASTIC SURGERY:  50F, BRCA2 positive, s/p POD1 bilateral nipple sparing mastectomy (Dr VALDEZ) with immediate reconstruction with JAMES flap (Dr Basim Morrison)  Doing well     Plan:  - general diet- SLIV - Mg supplement  - restart home medication  - Q1 flap check, Q2 after 24 hours  - THERON drain care  - no pressure over the flap  - cont Prevena vac  - will transition from IV heparin drip  to lovenox SC   - encourage ambulation  Admission Orders: Date/Time/Statement:   Admission Orders (From admission, onward)     Ordered        03/15/21 2103  Inpatient Admission  Once                   Orders Placed This Encounter   Procedures    Inpatient Admission     Standing Status:   Standing     Number of Occurrences:   1     Order Specific Question:   Level of Care     Answer:   Critical Care [15]     Order Specific Question:   Estimated length of stay     Answer:   More than 2 Midnights     Order Specific Question:   Certification     Answer:   I certify that inpatient services are medically necessary for this patient for a duration of greater than two midnights  See H&P and MD Progress Notes for additional information about the patient's course of treatment       Vital Signs: /59   Pulse 71   Temp 97 6 °F (36 4 °C) (Oral)   Resp (!) 24   Ht 5' 4" (1 626 m)   Wt 86 2 kg (190 lb 0 6 oz)   SpO2 94%   BMI 32 62 kg/m²   Diet:  Clear liquid & advance to Regular diet as tolerated  Mobility: OOB encourage ambulation;   DVT Prophylaxis: IV Heparin drip  transitioning to Lovenox SC Q24 HR   Q1 HR flap check; Q2HR after 24 HR; THERON drain care, no pressure over the FLAP, cont Prevena VAC  Medications/Pain Control:   Scheduled Medications:  acetaminophen, 975 mg, Oral, Q8H  aspirin, 325 mg, Oral, Daily  enoxaparin, 1 mg/kg, Subcutaneous, Q24H BRYSON  gabapentin, 300 mg, Oral, TID  ibuprofen, 800 mg, Oral, Q8H BRYSON  lisinopril, 10 mg, Oral, QAM  loratadine, 10 mg, Oral, Daily  metoprolol tartrate, 50 mg, Oral, QAM  senna-docusate sodium, 1 tablet, Oral, HS  sertraline, 50 mg, Oral, QAM  Continuous IV Infusions:     PRN Meds:  ALPRAZolam, 0 25 mg, Oral, BID PRN  cyclobenzaprine, 10 mg, Oral, TID PRN  diphenhydrAMINE, 25 mg, Intravenous, Q6H PRN  HYDROmorphone, 0 5 mg, Intravenous, Q4H PRN x1 3/15 & x1 3/16  ondansetron, 4 mg, Intravenous, Q6H PRN  oxyCODONE, 10 mg, Oral, Q4H PRN  x1 3/15  oxyCODONE, 5 mg, Oral, Q4H PRN x2 3/16  polyethylene glycol, 17 g, Oral, Once PRN  Network Utilization Review Department  ATTENTION: Please call with any questions or concerns to [phone] and carefully listen to the prompts so that you are directed to the right person  All voicemails are confidential   Juan Francisco Los all requests for admission clinical reviews, approved or denied determinations and any other requests to dedicated fax number below belonging to the campus where the patient is receiving treatment   List of dedicated fax numbers for the Facilities:  [address] DENIALS (Administrative/Medical Necessity) [phone]   [address] (Maternity/NICU/Pediatrics) [phone]   [address] [place] [address] Dr Du Asher [id number] (  Sonny Witt "Keyla" 103) [address] [place] [address] [address] [place] [address] [phone]

## 2021-03-16 NOTE — PROGRESS NOTES
Progress Note - Surgical Oncology   Lucian Malave 48 y o  female MRN: [id number]  Unit/Bed#: ICU 09 Encounter: [id number]    Assessment:  49 yo F with PMH of BRCA2 who is now s/p bilateral nipple sparing mastectomy with immediate reconstruction with bilateral JAMES flaps    Plan:  Advance diet as tolerated  [email or web address]@[email or web address], can d/c when tolerating sufficient PO  Heparin drip per plastics  Continue frequent flap checks with dopplers and   Continue continuous tissue oximetry  Follow up final path  Rest of care per ICU and plastics    Subjective/Objective     Subjective: Had a migraine last night that resolved with her home zoloft, tolerated a CLD with no nausea or vomiting, having pain in abdomen but minimal in breasts, not yet having bowel function    Objective:    Blood pressure 118/61, pulse 85, temperature 97 6 °F (36 4 °C), temperature source Oral, resp  rate 18, height 5' 4" (1 626 m), weight 86 2 kg (190 lb 0 6 oz), SpO2 95 %  ,Body mass index is 32 62 kg/m²        Intake/Output Summary (Last 24 hours) at 3/16/2021 0754  Last data filed at 3/16/2021 1820  Gross per 24 hour   Intake 5746 25 ml   Output 2765 ml   Net 2981 25 ml       Invasive Devices     Peripheral Intravenous Line            Peripheral IV 03/15/21 Right Hand 1 day    Peripheral IV 03/15/21 Left Wrist less than 1 day          Arterial Line            Arterial Line 03/15/21 Left Radial less than 1 day          Drain            Closed/Suction Drain Left;Lateral Abdomen Bulb 15 Fr  less than 1 day    Closed/Suction Drain Left;Lateral Chest Bulb 15 Fr  less than 1 day    Closed/Suction Drain Right;Lateral Abdomen Bulb 15 Fr  less than 1 day    Closed/Suction Drain Right;Lateral Chest Bulb 15 Fr  less than 1 day    Negative Pressure Wound Therapy (V A C ) Abdomen less than 1 day    Urethral Catheter Temperature probe;Straight-tip;Latex 16 Fr  less than 1 day                Physical Exam:  General: AAO x 3, NAD  HEENT: Normocephalic, atraumatic, conjunctiva normal, EOMI, PERRLA  Neck: No JVD, No LAD  Cardio: RRR  Resp: NWB on RA  Breast: Incisions c/d/i, bilateral THERON's with sanguinous output, strong doppler signals R>L, strong  signals A>B, oximeter reading 78% on the right and 76% on the left  Abd: Soft, appropriately tender, nondistended, No guarding, no rebound, prevena vac in situ, THERON x 2 in situ with sanguinous output  Neuro: Sensation and motor intact x 4 limbs  Extremities: WWP, No edema  Skin: Warm and dry      Lab, Imaging and other studies:I have personally reviewed pertinent lab results      VTE Pharmacologic Prophylaxis: Heparin  VTE Mechanical Prophylaxis: sequential compression device

## 2021-03-16 NOTE — ANESTHESIA POSTPROCEDURE EVALUATION
Post-Op Assessment Note    CV Status:  Stable  Pain Score: 2    Pain management: adequate     Mental Status:  Alert and awake   Hydration Status:  Euvolemic   PONV Controlled:  Controlled   Airway Patency:  Patent      Post Op Vitals Reviewed: Yes      Staff: CRNA         No complications documented      BP   128/89   Temp  97 1   Pulse  96   Resp 16   SpO2   97%

## 2021-03-16 NOTE — OP NOTE
OPERATIVE REPORT  PATIENT NAME: Daniela Peck    :  1970  MRN: [id number]  Pt Location: AN OR ROOM 03    SURGERY DATE: 3/15/2021    Surgeon(s) and Role:  Panel 1:     * Georgia Gaytan MD - Primary     * Michaela Phillip MD - Assisting  Panel 2:     * Yaya Francisco MD - Primary surgeon     * Derek Birmingham PA-C - Assisting     * Dahlia Vazquez MD - Assisting     * Noe Durand MD - cosurgeon    Preop Diagnosis:  BRCA2 gene mutation positive [Z15 01, Z15 09]    Post-Op Diagnosis Codes:     * BRCA2 gene mutation positive [Z15 01, Z15 09]    Procedure(s) (LRB):  BREAST NIPPLE SPARING MATECTOMY (Bilateral)  BREAST IMMEDIATE BILATERAL RECONSTRUCTION WITH Right MS2 and Left JAMES flaps; EXCISION RIB (Bilateral)  PREVENA PLACEMENT (N/A)    Specimen(s):  ID Type Source Tests Collected by Time Destination   1 : Right breast: long suture marks lateral, medium suture marks medial, short suture marks superior Tissue Breast, Right TISSUE EXAM Georgia Gaytan MD 3/15/2021 3352    2 : Left breast: long suture marks lateral, medium suture marks medial, short suture marks superior Tissue Breast, Left TISSUE EXAM Georgia Gaytan MD 3/15/2021 1042        Estimated Blood Loss:   200 mL    Drains:  Closed/Suction Drain Left;Lateral Chest Bulb 15 Fr  (Active)   Site Description Unable to view 03/15/21 2015   Dressing Status Clean;Dry; Intact 03/15/21 2015   Drainage Appearance Bloody 03/15/21 2015   Status To bulb suction 03/15/21 2015   Output (mL) 40 mL 03/15/21 2015   Number of days: 0       Closed/Suction Drain Right;Lateral Chest Bulb 15 Fr  (Active)   Site Description Unable to view 03/15/21 2015   Dressing Status Clean;Dry; Intact 03/15/21 2015   Drainage Appearance Bloody 03/15/21 2015   Status To bulb suction 03/15/21 2015   Output (mL) 30 mL 03/15/21 2015   Number of days: 0       Closed/Suction Drain Left;Lateral Abdomen Bulb 15 Fr  (Active)   Site Description Unable to view 03/15/21 2015   Dressing Status Clean;Dry; Intact 03/15/21 2015   Drainage Appearance Bloody 03/15/21 2015   Output (mL) 20 mL 03/15/21 2015   Number of days: 0       Closed/Suction Drain Right;Lateral Abdomen Bulb 15 Fr  (Active)   Site Description Unable to view 03/15/21 2015   Dressing Status Clean;Dry; Intact 03/15/21 2015   Drainage Appearance Bloody 03/15/21 2015   Output (mL) 20 mL 03/15/21 2015   Number of days: 0       Negative Pressure Wound Therapy (V A C ) Abdomen (Active)   Unit Type PREVENA 03/15/21 2015   Other- # applied 1 03/15/21 1937   Dressing Status Clean; Intact;Dry 03/15/21 2045   Number of days: 0       Urethral Catheter Temperature probe;Straight-tip;Latex 16 Fr  (Active)   Reasons to continue Urinary Catheter  Accurate I&O assessment in critically ill patients (48 hr  max) 03/15/21 2015   Goal for Removal Remove after 48 hrs of I/O monitoring 03/15/21 2015   Site Assessment Skin intact 03/15/21 2015   Collection Container Standard drainage bag 03/15/21 2015   Securement Method Securing device (Describe) 03/15/21 2015   Output (mL) 300 mL 03/15/21 2015   Number of days: 0       Anesthesia Type:   General    Operative Indications:  BRCA2 gene mutation positive [Z15 01, S12 87]    Complications:   None    Procedure and Technique: This was a combined procedure with plastic surgery and surgical oncology  Dr Yvette Londono performed bilateral nipple-sparing mastectomies  The plastic surgery team consisted of Dr Ashlie Wells as primary surgeon and myself as co-surgeon  Both of us were medically necessary as the case required technical and microvascular skill to ensure that the patient could undergo bilateral, immediate JAMES breast reconstruction safely and expeditiously  Dr Ashlie Wells will dictate his portion of the case  Patient's chest and abdomen were prepped and draped in the normal sterile fashion  Dr Ashlie Wells began by harvesting the flaps  After Dr Yvette Londono completed bilateral nipple sparing mastectomies via IMF incisions   I began harvesting the chest vessels  I dissected down through the pec to the level of the 4th rib  Using a freer I dissected the perichondrium off the rib, followed by using a rongeur to remove the medial aspect of the exposed rib  I then carefully dissected the remaining perichondrium from distal to proximal, and lateral to medial exposing the ALEXANDRA vessels  On both the right and left chest, there was one large VC  The interspace was then dissected free from rib to rib, circumferentially freeing up the ALEXANDRA and VC  This was performed bilaterally  Ultimately, a 2 5mm  was used on the left side for venous anastomosis, and a 4 0 mm  was used on the right side for venous anastomosis  I assisted Dr Margaux Maldonado in completing the microvascular anastomosis  After the anastomosis was completed bilaterally, the breasts were inset  I assisted Dr Margaux Maldonado with insetting the breast flaps  After tailor-tacking the flaps into the breast pocket and marking the skin paddle  The remainder of the flap was de-epithelialized and secured to the breast flap with 3-0 vicryl for the dermis and 3-0 stratafix for the skin  A vioptix monitor was placed overlying the flaps   The remainder of the abdominal closure was performed by Dr Jennifer Curtis team       Patient Disposition:  PACU     SIGNATURE: Joceline Beyer MD  DATE: March 15, 2021  TIME: 9:47 PM

## 2021-03-16 NOTE — PROGRESS NOTES
Progress Note - General Surgery   Karol Germain 48 y o  female MRN: [id number]  Unit/Bed#: ICU 09 Encounter: [id number]    Assessment:  50F, BRCA2 positive, s/p POD1 bilateral nipple sparing mastectomy (Dr Ronaldo Castleman) with immediate reconstruction with JAMES flap (Dr Vedia Dakin)  Doing well  Plan:  - general diet  - SLIV  - Mg supplement  - restart home medication  - Q1 flap check, Q2 after 24 hours  - THERON drain care  - no pressure over the flap  - cont Prevena vac  - will transition from IV heparin to lovenox  - encourage ambulation    Subjective/Objective     Subjective: No acute overnight event  Pain well controlled  Objective:     Blood pressure 114/65, pulse 80, temperature 97 6 °F (36 4 °C), temperature source Oral, resp  rate 12, height 5' 4" (1 626 m), weight 86 2 kg (190 lb 0 6 oz), SpO2 94 %  ,Body mass index is 32 62 kg/m²        Intake/Output Summary (Last 24 hours) at 3/16/2021 0833  Last data filed at 3/16/2021 0800  Gross per 24 hour   Intake 6486 25 ml   Output 3090 ml   Net 3396 25 ml       Invasive Devices     Peripheral Intravenous Line            Peripheral IV 03/15/21 Left Wrist 1 day    Peripheral IV 03/15/21 Right Hand 1 day          Drain            Urethral Catheter Temperature probe;Straight-tip;Latex 16 Fr  1 day    Closed/Suction Drain Left;Lateral Abdomen Bulb 15 Fr  less than 1 day    Closed/Suction Drain Left;Lateral Chest Bulb 15 Fr  less than 1 day    Closed/Suction Drain Right;Lateral Abdomen Bulb 15 Fr  less than 1 day    Closed/Suction Drain Right;Lateral Chest Bulb 15 Fr  less than 1 day    Negative Pressure Wound Therapy (V A C ) Abdomen less than 1 day                GEN: NAD  CV: regular rate  PULM: non-labored breathing  Breast: bilateral mastectomy with mild ecchymosis, flaps soft, skin paddles viable, warm, 2s cap refill, Vioptix R 74% L 76%, THERON serosanguinous, good flow  venous signal and doppler arterial signals, no hematoma  Abd: soft, non-distended, appropriately tender, Prevena in place, THERON x2 serosanguinous     Lab, Imaging and other studies:  CBC:   Lab Results   Component Value Date    WBC 9 69 03/16/2021    HGB 9 6 (L) 03/16/2021    HCT 28 9 (L) 03/16/2021    MCV 95 03/16/2021     03/16/2021    MCH 31 5 03/16/2021    MCHC 33 2 03/16/2021    RDW 12 6 03/16/2021    MPV 9 9 03/16/2021   , CMP:   Lab Results   Component Value Date    SODIUM 140 03/16/2021    K 3 5 03/16/2021     03/16/2021    CO2 25 03/16/2021    CO2 20 (L) 03/15/2021    BUN 12 03/16/2021    CREATININE 0 80 03/16/2021    GLUCOSE 127 03/15/2021    CALCIUM 7 8 (L) 03/16/2021    EGFR 86 03/16/2021   , Coagulation: No results found for: PT, INR, APTT  VTE Pharmacologic Prophylaxis: Enoxaparin (Lovenox)  VTE Mechanical Prophylaxis: sequential compression device

## 2021-03-16 NOTE — OP NOTE
OPERATIVE REPORT  PATIENT NAME: Roderick Hensley    :  1970  MRN: [id number]  Pt Location: AN OR ROOM 03    SURGERY DATE: 3/15/2021    Surgeon(s) and Role:  Panel 1:     * Janusz Chirinos MD - Primary     * Chica Nathan MD - Assisting  Panel 2:     * Keturah Vega MD - Primary     * Kamini Nathan PA-C - Assisting     * Yadira Aly MD - Assisting     * Luz Otoole MD- Assisting    Preop Diagnosis:  BRCA2 gene mutation positive [Z15 01, Z15 09]    Post-Op Diagnosis Codes:     * BRCA2 gene mutation positive [Z15 01, Z15 09]    Procedure(s) (LRB):  BREAST NIPPLE SPARING MATECTOMY (Bilateral)  BREAST IMMEDIATE RECONSTRUCTION WITH FLAP FREE DEEP INFERIOR EPIGASTRIC  (JAMES);  EXCISION RIB (Bilateral)  IMPLANTATION OF MESH TO RIGHT HEMIABDOMEN  PREVENA PLACEMENT (N/A)    Specimen(s):  ID Type Source Tests Collected by Time Destination   1 : Right breast: long suture marks lateral, medium suture marks medial, short suture marks superior Tissue Breast, Right TISSUE EXAM Janusz Chirinos MD 3/15/2021 4910    2 : Left breast: long suture marks lateral, medium suture marks medial, short suture marks superior Tissue Breast, Left TISSUE EXAM Janusz Chirinos MD 3/15/2021 1042        Estimated Blood Loss:   200 mL    Drains:  Closed/Suction Drain Left;Lateral Chest Bulb 15 Fr  (Active)   Site Description Unable to view 21 06   Dressing Status Clean;Dry; Intact 21 06   Drainage Appearance Bloody 21 06   Status To bulb suction 21 0600   Output (mL) 20 mL 21 06   Number of days: 1       Closed/Suction Drain Right;Lateral Chest Bulb 15 Fr  (Active)   Site Description Unable to view 21 06   Dressing Status Clean;Dry; Intact 21 06   Drainage Appearance Bloody 21   Status To bulb suction 21 0600   Output (mL) 10 mL 21   Number of days: 1       Closed/Suction Drain Left;Lateral Abdomen Bulb 15 Fr   (Active)   Site Description Unable to view 03/16/21 0600   Dressing Status Clean;Dry; Intact 03/16/21 0600   Drainage Appearance Bloody 03/16/21 0600   Status To bulb suction 03/16/21 0600   Output (mL) 10 mL 03/16/21 0600   Number of days: 1       Closed/Suction Drain Right;Lateral Abdomen Bulb 15 Fr  (Active)   Site Description Unable to view 03/16/21 0600   Dressing Status Clean;Dry; Intact 03/16/21 0600   Drainage Appearance Bloody 03/16/21 0600   Status To bulb suction 03/16/21 0600   Output (mL) 20 mL 03/16/21 0600   Number of days: 1       Negative Pressure Wound Therapy (V A C ) Abdomen (Active)   Unit Type Prevena 03/16/21 0400   Other- # applied 1 03/15/21 1937   Target Pressure (mmHg) 125 03/16/21 0400   Dressing Status Clean;Dry; Intact 03/16/21 0400   Output (mL) 0 mL 03/16/21 0600   Number of days: 1       Urethral Catheter Temperature probe;Straight-tip;Latex 16 Fr  (Active)   Reasons to continue Urinary Catheter  Accurate I&O assessment in critically ill patients (48 hr  max) 03/16/21 0400   Goal for Removal Remove after 48 hrs of I/O monitoring 03/16/21 0400   Site Assessment Clean;Skin intact 03/16/21 0400   Collection Container Standard drainage bag 03/16/21 0400   Securement Method Securing device (Describe) 03/16/21 0400   Output (mL) 325 mL 03/16/21 0800   Number of days: 1       Anesthesia Type:   General    Operative Indications:  BRCA2 gene mutation positive [Z15 01, Z15 09]    Statement of Medical Necessity:     Mrs David Good is a very pleasant 48years old female who has BRCA2 gene mutation  Her surgical oncologist has recommended bilateral mastectomies and she was counselled extensively about breast reconstructive options  She has elected to undergo bilateral immediate reconstruction with free tissue transfer from the abdomen and was counseled regarding the risks, benefits, rationale and alternatives to this procedure   We have reviewed the incisions/scars, duration, recovery time, postoperative restrictions, and follow-up, drains, and donor site morbidity  Risks that were discussed include surgical (bleeding, infection, hematoma, seroma, wound breakdown, need for further surgery, pain, numbness, weakness, asymmetry, cosmetic deformity, injury to structures) and medical (heart attack, pneumonia, DVT/PE, death)  We also have discussed partial/complete flap loss  The possible need for revision to the reconstructed breasts or donor sites was discussed  All of her questions were answered preoperatively, and she wished to proceed  Operative Findings:  Right MS2- TRAM anastomosed to left chest   Left JAMES flap anastomosed to right chest    Complications:   None    Procedure and Technique:  The patient was met in the preoperative holding area, and appropriate the presurgical marks were placed in the standing position  The patient was then brought back to the operating room and after uneventful induction of general anesthesia the patient was prepped and draped in the usual sterile fashion  A time-out to identify the patient and all appropriate preoperative measures was performed  The surgical oncology team began the nippler sparing mastectomies via marked IMF approach  Concurrently, the plastic surgery service began their harvest of the abdominal free flaps  The designed flaps were cut on the skin using sharp dissection  The dissection plane was then carried down to the abdominal wall using electrocautery  The upper flap in the abdomen was elevated off of the abdominal wall fascia up to the level of the costal margin and xiphoid using electrocautery and hemoclips to control any perforating vessels  The lower incisional flap was then incised, and a 6 cm segment of the SIEV was dissected and clipped distally on the left flap and a 7 cm segment of the SIEV was dissected and clipped distally on the right flap  We then turned our attention toward elevation of the flaps       The flaps were harvested in lateral to medial fashion elevating it off of the anterior abdominal wall fascia  Once all the perforators were identified and isolated we designed our flaps around the dominant perforators to be included  On the left abdomen, we elected to harvest a 2 vessel JAMES flap based on the medial row perforators  On the right abdomen,  we elected to harvest a 3 vessel Muscle sparing 2 TRAM flap sparing medial and lateral row perforators  We first incised the fascia around our selected perforators using electrocautery  We then dissected the selected perforators through the rectus muscle back to the deep inferior epigastric pedicle, sparing the entirety of the rectus muscle  The pedicle was then dissected back to its origin at the external iliac vessels  The flap vessels were then bathed in papaverine  Upon completion of this, we turned our attention toward the exposure of the internal mammary vessels  Under the operating microscope, the vein and artery were then prepared for microvascular anastomosis by clipping the distal aspects and controlling the proximal vessels with A[place]and clamps  We then turned our attention toward harvest of the flaps  Each flap was harvested and then anastomosed the the internal mammary vessels in an identical fashion except where noted below  The deep inferior epigastric vessels were then clipped with hemoclips  The flaps was thus rendered ischemic, flushed with heparinized saline, and transferred to the chest       On the left, the internal mammary vein was anastomosed to the flap vena comitantes end-to-end with a 2 5 mm flow   The artery was then anastomosed end-to-end with interrupted 8-0 nylon suture  Upon release of the clamps, the flap perfused well and showed no sign of venous insufficiency or arterial insufficiency  On the right, the internal mammary vein was anastomosed to the flap vena comitantes end-to-end with a 4 0 mm flow    The artery was then anastomosed end-to-end with interrupted 8-0 nylon suture  Upon release of the clamps, the flap perfused well and showed no sign of venous insufficiency or arterial insufficiency  We then began the inset of the flaps  IMF approach was used and nipples appear viable  The mastectomy skin was temporarily closed and the flap inset with staples  The area of the flap to de-epithelialized was marked and the staples were removed, leaving just a small skin paddled at [facility] for postoperative monitoring  The flap was de-epithelialized and then inset to the chest wall with several 2- 0 Vicryl sutures in the lateral and inferior aspects of the flap  One 15- [place] round [place] drains were then placed in each breast, and the wound was irrigated copiously with normal saline  The skin was then closed with interrupted 3-0 Monocryl sutures and a running 4-0 Monocryl subcuticular suture  We then turned our attention to the closure of the abdomen  A piece of Bard Phasix mesh was selected for right hemiabdomen  A piece of mesh was then inset into both fascial defects in the retrorectus plane with buried horizontal mattress #1 PDS sutures  The left hemiabdomen fascia was closed primarily with figure of #8 1-0 PDS sutures  The fascia was then closed over the mesh with buried interrupted figure-of-eight #1 PDS sutures  The abdomen was then copiously irrigated with normal saline, and meticulous hemostasis was verified  Two 15- [place] drains were then placed in the abdomen and brought out through the lateral lower abdomen  The abdomen was then closed in layers with interrupted 2-0 Vicryl in the Igors layer, buried interrupted 3-0 Vicryl in the deep dermis, and a running 4-0 Biosyn subcuticular  The umbilicus was then brought out through a new incision in the abdomen and secured to the abdominal flap using interrupted 4-0 Vicryl sutures and a running 5-0 Biosyn      A Prevena Plus incisional VAC was then fashioned and applied to the abdominal donor and connected at 125 mm Hg     A strong Doppler signal was then identified on each flap and marked with a 6-0 Prolene suture  Dermabond was applied to the incisions and a surgical bra was placed on the patient  At the completion of the operation, all counts were reported as correct  As the attending physician, I attest that I was present for the entire procedure and performed all critical aspects of it  From my standpoint, the patient tolerated the procedure well and will transfer to the plastic surgery floor for postoperative monitoring and recovery  A qualified resident was not available and A physician assistant was required during the procedure for retraction tissue handling,dissection and suturing    Please note that Dr Krishna Canas, was First assist on this case and no other qualified person was available  The First assist provided exposure and preparation of recipient internal mammary vessels, assistance with microvascular anastomosis and flap inset  It was not possible to perform this procedure without his assistance because the attending surgeon would be unable to adequately and safely complete all the necessary steps of the procedure without significantly increasing the time required to complete it and the risk of complications       Patient Disposition:  PACU  and hemodynamically stable    SIGNATURE: Tammie Chavez MD  DATE: March 16, 2021  TIME: 9:20 AM

## 2021-03-17 PROCEDURE — 97163 PT EVAL HIGH COMPLEX 45 MIN: CPT

## 2021-03-17 PROCEDURE — 99024 POSTOP FOLLOW-UP VISIT: CPT | Performed by: SURGERY

## 2021-03-17 PROCEDURE — 99024 POSTOP FOLLOW-UP VISIT: CPT | Performed by: PLASTIC SURGERY

## 2021-03-17 RX ORDER — DIPHENHYDRAMINE HYDROCHLORIDE 50 MG/ML
25 INJECTION INTRAMUSCULAR; INTRAVENOUS ONCE
Status: COMPLETED | OUTPATIENT
Start: 2021-03-17 | End: 2021-03-18

## 2021-03-17 RX ORDER — METOCLOPRAMIDE HYDROCHLORIDE 5 MG/ML
10 INJECTION INTRAMUSCULAR; INTRAVENOUS ONCE
Status: COMPLETED | OUTPATIENT
Start: 2021-03-17 | End: 2021-03-17

## 2021-03-17 RX ORDER — KETOROLAC TROMETHAMINE 30 MG/ML
15 INJECTION, SOLUTION INTRAMUSCULAR; INTRAVENOUS ONCE
Status: COMPLETED | OUTPATIENT
Start: 2021-03-17 | End: 2021-03-17

## 2021-03-17 RX ADMIN — GABAPENTIN 300 MG: 300 CAPSULE ORAL at 08:17

## 2021-03-17 RX ADMIN — ACETAMINOPHEN 975 MG: 325 TABLET, FILM COATED ORAL at 13:54

## 2021-03-17 RX ADMIN — LORATADINE 10 MG: 10 TABLET ORAL at 08:17

## 2021-03-17 RX ADMIN — OXYCODONE 5 MG: 5 TABLET ORAL at 03:19

## 2021-03-17 RX ADMIN — KETOROLAC TROMETHAMINE 15 MG: 30 INJECTION, SOLUTION INTRAMUSCULAR at 23:59

## 2021-03-17 RX ADMIN — IBUPROFEN 800 MG: 400 TABLET ORAL at 21:06

## 2021-03-17 RX ADMIN — OXYCODONE 5 MG: 5 TABLET ORAL at 08:17

## 2021-03-17 RX ADMIN — METOCLOPRAMIDE HYDROCHLORIDE 10 MG: 5 INJECTION INTRAMUSCULAR; INTRAVENOUS at 23:59

## 2021-03-17 RX ADMIN — SERTRALINE HYDROCHLORIDE 50 MG: 50 TABLET ORAL at 08:17

## 2021-03-17 RX ADMIN — IBUPROFEN 800 MG: 400 TABLET ORAL at 13:54

## 2021-03-17 RX ADMIN — METOPROLOL TARTRATE 50 MG: 50 TABLET, FILM COATED ORAL at 08:17

## 2021-03-17 RX ADMIN — ACETAMINOPHEN 975 MG: 325 TABLET, FILM COATED ORAL at 05:38

## 2021-03-17 RX ADMIN — OXYCODONE 5 MG: 5 TABLET ORAL at 19:42

## 2021-03-17 RX ADMIN — LISINOPRIL 10 MG: 10 TABLET ORAL at 08:17

## 2021-03-17 RX ADMIN — GABAPENTIN 300 MG: 300 CAPSULE ORAL at 21:06

## 2021-03-17 RX ADMIN — IBUPROFEN 800 MG: 400 TABLET ORAL at 05:37

## 2021-03-17 RX ADMIN — ACETAMINOPHEN 975 MG: 325 TABLET, FILM COATED ORAL at 21:07

## 2021-03-17 RX ADMIN — GABAPENTIN 300 MG: 300 CAPSULE ORAL at 16:58

## 2021-03-17 RX ADMIN — SENNOSIDES AND DOCUSATE SODIUM 1 TABLET: 8.6; 5 TABLET ORAL at 21:06

## 2021-03-17 RX ADMIN — ENOXAPARIN SODIUM 90 MG: 100 INJECTION SUBCUTANEOUS at 08:19

## 2021-03-17 RX ADMIN — ASPIRIN 325 MG ORAL TABLET 325 MG: 325 PILL ORAL at 08:17

## 2021-03-17 NOTE — PROGRESS NOTES
Progress Note - General Surgery   Lili Congress 48 y o  female MRN: [id number]  Unit/Bed#: ICU 09 Encounter: [id number]    Assessment:  50F, BRCA2 positive, s/p POD2 bilateral nipple sparing mastectomy (Dr Skyler Garcia) with immediate reconstruction with JAMES flap (Dr Benigno Pulido)  Doing well  Plan:  - general diet  - SLIV  - Q2 flap check, Q4 after 48 hours  - THERON drain care; may need VNA  - no pressure over the flap  - cont Prevena vac  - lovenox while in house  - ASA  - encourage ambulation  - downgrade to Stepdown 1  - will set up an appointment for the patient for Vac removal in clinic next week    Subjective/Objective     Subjective: No acute overnight event  Pain well controlled  Objective:     Blood pressure 132/73, pulse 88, temperature 98 5 °F (36 9 °C), temperature source Oral, resp  rate 19, height 5' 4" (1 626 m), weight 91 5 kg (201 lb 11 5 oz), SpO2 96 %  ,Body mass index is 34 63 kg/m²        Intake/Output Summary (Last 24 hours) at 3/17/2021 0936  Last data filed at 3/17/2021 0545  Gross per 24 hour   Intake 1540 ml   Output 3610 ml   Net -2070 ml       Invasive Devices     Peripheral Intravenous Line            Peripheral IV 03/15/21 Right Hand 2 days          Drain            Closed/Suction Drain Left;Lateral Abdomen Bulb 15 Fr  1 day    Closed/Suction Drain Left;Lateral Chest Bulb 15 Fr  1 day    Closed/Suction Drain Right;Lateral Abdomen Bulb 15 Fr  1 day    Closed/Suction Drain Right;Lateral Chest Bulb 15 Fr  1 day    Negative Pressure Wound Therapy (V A C ) Abdomen 1 day                GEN: NAD  CV: regular rate  PULM: non-labored breathing  Breast: bilateral mastectomy with mild ecchymosis, flaps soft, skin paddles viable, warm, 2s cap refill, Vioptix R 99% L 75%, THERON serosanguinous, good flow  venous signal and doppler arterial signals, no hematoma  Abd: soft, non-distended, appropriately tender, Prevena in place, THERON x2 serosanguinous     Lab, Imaging and other studies:  CBC:   No results found for: WBC, HGB, HCT, MCV, PLT, ADJUSTEDWBC, MCH, MCHC, RDW, MPV, NRBC, CMP:   No results found for: SODIUM, K, CL, CO2, ANIONGAP, BUN, CREATININE, GLUCOSE, CALCIUM, AST, ALT, ALKPHOS, PROT, BILITOT, EGFR, Coagulation: No results found for: PT, INR, APTT  VTE Pharmacologic Prophylaxis: Enoxaparin (Lovenox)  VTE Mechanical Prophylaxis: sequential compression device

## 2021-03-17 NOTE — CASE MANAGEMENT
LOS: 1  RISK OF UNPLANNED READMISSION SCORE: 8  30 DAY READMISSION: NO  BUNDLE: NO    CM met with patient at bedside  Patient is alert and oriented and seated in the chair in her room  CM name and role reviewed  Patient lives at home with her SO and grandson in a 2 story home  There is 1 [address] and once she is on a level she will be able to remain there  Patient reports she has a shower bench at home and no other DME  Patient is independent with ADL's  No HX of VNA or STR  Patient has RX coverage and uses [facility] in [place] in [address] with no issues getting or affording her medications  Patient has no HX of any mental health or substance abuse issues  Patient's PCP is Lakisha Jo MD   Patient does not have a POA or AD/LW and is interested in information on both  CM will print this and provide to patient  Patient is currently unemployed and collected unemployment  Patient drives and will have a ride home at [place]  CM consult received for Home Care  CM discussed with patient the possible need [facility] at [place]  Patient is requesting this be set up  CM explained that nursing staff will provide teaching here in the hospital and that the SN will do the same but they will not be out every day  A post acute care recommendation was made by your care team for [facility]  Discussed [place] of Choice with patient  List of agencies given to patient via in person  patient aware the list is custom filtered for them by zip code location and that [facility] post acute providers are designated  Patient was agreeable to a blanket referral and accepting the agency that has [facility]  CM reviewed discharge planning process including the following: identifying caregivers at home, preference for d/c planning needs, [facility] to Bed program, availability of treatment team to discuss questions or concerns patient and/or family may have regarding diagnosis, plan of care, old or new medications and discharge planning  CM will continue to follow for care coordination and update assessment as necessary

## 2021-03-17 NOTE — UTILIZATION REVIEW
Notification of Inpatient Admission/Inpatient Authorization Request   This is a Notification of Inpatient Admission for [address]  Be advised that this patient was admitted to our facility under Inpatient Status  Contact Yaquelin Machado at [phone] for additional admission information  [address] [facility] DEPT  DEDICATED [phone]  Patient Name:   Lucian Malave   YOB: 1970       [address]   [address]:   [address]  Tax ID: [id number]  NPI: [id number] Attending Provider/NPI:  Address:  Phone: Eric Garza [place] [[id number]]  Same as the facility  [phone]   Place of Service Code: 24 Place of Service Name:  [address] [place] [address]   Start Date: 3/15/21 2103     Discharge Date & Time: No discharge date for patient encounter  Type of Admission: Inpatient Status Discharge Disposition   (if discharged): Final discharge disposition not confirmed   Patient Diagnoses: BRCA2 gene mutation positive [Z15 01, Z15 09]     Orders: Admission Orders (From admission, onward)     Ordered        03/15/21 2103  Inpatient Admission  Once                    Assigned Utilization Review Contact: Yaquelin Machado  Utilization   Network Utilization Review Department  Phone: [phone]; Fax [phone]  Email: [email or web address]   ATTENTION PAYERS: Please call the assigned Utilization  directly with any questions or concerns ALL voicemails in the department are confidential  Send all requests for admission clinical reviews, approved or denied determinations and any other requests to dedicated fax number belonging to the campus where the patient is receiving treatment

## 2021-03-17 NOTE — PHYSICAL THERAPY NOTE
PHYSICAL THERAPY EVALUATION NOTE    Patient Name: Zuri Matthew  HNZMY'H Date: 3/17/2021  AGE:   48 y o  Mrn:   [id number]  ADMIT DX:  BRCA2 gene mutation positive [Z15 01, Z15 09]    Past Medical History:   Diagnosis Date    Anxiety     Hypertension     Migraines      Length Of Stay: 2  PHYSICAL THERAPY EVALUATION :    03/17/21 1701   PT Last Visit   PT Visit Date 03/17/21   Note Type   Note type Evaluation   Pain Assessment   Pain Assessment Tool 0-10   Pain Score 5   Pain Location/Orientation Location: Abdomen; Location: Head   Home Living   Type of [address] [place] [address] Two level; Able to live on main level with bedroom/bathroom; Other (Comment)  (1 ÁLVARO)   Additional Comments lives w/ significant other and grandson  ambulates w/o device  owns shower chair  independent w/ ADLs and driving  no falls in last 6 months  Prior Function   Comments pt seen sitting in chair w/ family present  agreed to PT eval  reports pain as noted above  Restrictions/Precautions   Other Precautions Telemetry;Multiple lines;Pain  (VAC dressing)   General   Additional Pertinent History 3/16/20 at 16:00, blood pressure was 87/51  Family/Caregiver Present Yes   Cognition   Arousal/Participation Alert   Orientation Level Oriented to person; Other (Comment)  (pt was identified w/ full name, birth date)   Following Commands Follows all commands and directions without difficulty   Comments room air resting pulse ox 97% and 77 BPM, active 94% and 85 BPM    RUE Assessment   RUE Assessment   (shoulder not tested, otherwise WFL active ROM)   LUE Assessment   LUE Assessment   (shoulder not tested, otherwise WFL active ROM)   RLE Assessment   RLE Assessment WFL  (4- to 4/5, hip flexion not tested)   LLE Assessment   LLE Assessment WFL  (4- to 4/5, hip flexion not tested)   Coordination   Movements are Fluid and Coordinated 1   Light Touch   RLE Light Touch Grossly intact   LLE Light Touch Grossly intact   Transfers   Sit to Stand 6  Modified independent   Additional items Increased time required   Stand to Sit 6  Modified independent   Additional items Increased time required   Additional Comments Comfortable Gait Speed: 0 47 m/s   Ambulation/Elevation   Gait pattern Wide SINGH; Forward Flexion; Short stride   Gait Assistance 5  Supervision   Additional items Verbal cues  (for posture, full step length)   Assistive Device None   Distance 150 feet x2 w/ brief standing rest break  (additional not possible due to pain and fatigue)   Balance   Static Sitting Good   Dynamic Sitting Fair +   Static Standing Fair   Ambulatory Fair -   Activity Tolerance   Activity Tolerance Patient limited by fatigue;Patient limited by pain   Nurse Made Aware spoke to [facility]   Assessment   Prognosis Good   Problem List Decreased strength;Decreased range of motion;Decreased endurance; Impaired balance;Decreased mobility; Decreased safety awareness;Pain;Decreased skin integrity   Assessment Pt was diagnosed with a BRCA 2 mutation many years ago  Dx: BRCA2 gene mutation positive  3/15/21 bilateral breast nipple sparing mastectomy  order placed for PT eval and tx, w/ activity order of up in chair  pt presents w/ comorbidities of HTN and bilateral carpal tunnel release and personal factors of living in 2 story house, stair(s) to enter home and anxiety  pt presents w/ pain, weakness, decreased ROM, decreased endurance, impaired balance, gait deviations, decreased safety awareness, fall risk and impaired skin integrity   these impairments are evident in findings from physical examination (weakness, decreased ROM and impaired skin integrity), mobility assessment (need for standby assist w/ ambulation when usually mobilizing independently, tolerance to only 150 feet of ambulation and need for cueing for mobility technique), and Barthel Index: 80/100 and Gait Speed: 0 47 m/s (less than 1 0 m/s indicates need for intervention to address fall risk)  pt needed input for task focus and mobility technique  pt is at risk for falls due to physical and safety awareness deficits  pt's clinical presentation is unstable/unpredictable (evident in poor blood pressure control, tolerance to only 150 feet of ambulation, pain impacting overall mobility status and need for input for mobility technique)  pt needs inpatient PT tx to improve mobility deficits and progress mobility training as appropriate  discharge recommendation is for home w/ family support to reduce fall risk and maximize level of functional independence  Goals   Patient Goals go home and sit outside   STG Expiration Date 03/27/21   Short Term Goal #1 pt will: Increase bilateral LE strength 1/2 grade to facilitate independent mobility, Ambulate 500 ft  without assistive device independently w/o LOB, Navigate 1 stair independently without handrail to facilitate return to previous living environment, Increase ambulatory balance 1/2 grade to decrease risk for falls, Tolerate 3 hr OOB to faciliate upright tolerance, Improve gait speed to 0 57 m/s to reduce fall risk and increase independence and Improve Barthel Index score to 100 to facilitate independence   PT Treatment Day 0   Plan   Treatment/Interventions LE strengthening/ROM; Elevations; Therapeutic exercise; Endurance training;Gait training;Patient/family training   PT Frequency 1-2x/wk   Recommendation   PT Discharge Recommendation Return to previous environment with social support   AM-PAC Basic Mobility Inpatient   Turning in Bed Without Bedrails 4   Lying on Back to Sitting on Edge of Flat Bed 3   Moving Bed to Chair 4   Standing Up From Chair 4   Walk in Room 3   Climb 3-5 Stairs 3   Basic Mobility Inpatient Raw Score 21   Basic Mobility Standardized Score 45 55   Barthel Index   Feeding 10   Bathing 0   Grooming Score 5   Dressing Score 5   Bladder Score 10   Bowels Score 10   Toilet Use Score 10   Transfers (Bed/Chair) Score 15   Mobility (Level Surface) Score 10   Stairs Score 5   Barthel Index Score 80     Comfortable Gait Speed: 0 47 m/s  Gait Speed Interpretation:  Gain of 0 1 m/s is a predictor of well-being in those w/ abnormal walking speed compared to age-patched peers  <0 7m/s is at an increased risk for falls    Household ambulator: <0 4 m/s  Limited community ambulator: 0 4-0 8 m/s  [employer] ambulator: 0 8-1 2 m/s  Able to safely cross streets: >1 2 m/s    The patient's AM-PAC Basic Mobility Inpatient Short Form Raw Score is 21, Standardized Score is 45 55  A standardized score greater than 42 9 suggests the patient may benefit from discharge to home  Please also refer to the recommendation of the Physical Therapist for safe discharge planning  Skilled PT recommended while in hospital and upon DC to progress pt toward treatment goals       Cornelio Toledo, PT

## 2021-03-17 NOTE — NURSING NOTE
StO2 to flap intermittently reading  On call Surgery Resident contacted and at bedside  Probes replaced with left probe reading and right probe only reading intermittently  Spoke with Cynthia Conway MD and will continue to monitor  and doppler checks  He is aware of the StO2 not reading properly  No further instructions at this time  Will continue to monitor   Dora Willoughby RN

## 2021-03-17 NOTE — PROGRESS NOTES
Progress Note -    Sandra Merrill 48 y o  female MRN: [id number]  Unit/Bed#: ICU 09 Encounter: [id number]    Assessment:  49 yo F with PMH of BRCA2 who is now s/p bilateral nipple sparing mastectomy with immediate reconstruction with bilateral JAMES flaps  Stable VS in room air  Flap ox: 92-93%  dopp flap signals b/l  PLAN:  - VTE prophylaxis measures  - other care per plastics/ICU team     Subjective:   - no new complaints  Objective:     Vitals: Temp:  [97 8 °F (36 6 °C)-98 8 °F (37 1 °C)] 98 5 °F (36 9 °C)  HR:  [68-84] 73  Resp:  [12-23] 18  BP: ()/(50-77) 124/77  Arterial Line BP: (114)/(104) 114/104  Body mass index is 34 63 kg/m²  I/O       03/15 0701 - 03/16 0700 03/16 0701 - 03/17 0700 03/17 0701 - 03/18 0700    P  O  240 2060     I V  (mL/kg) 4156 3 (48 2) 428 7 (4 7)     IV Piggyback 1300 100     Total Intake(mL/kg) 5696 3 (66 1) 2588 7 (28 3)     Urine (mL/kg/hr) 2170 (1) 3575 (1 6)     Drains 395 360     Blood 200      Total Output 2765 3935     Net +2931 3 -1346 3                  Physical Exam:  GEN: NAD  HEENT: atraumatic, normocephalic  CV: RRR  Lung: Normal effort  Breasts: dressings clean/dry   Ab: Soft, NT/ND  Extrem: No CCE  Neuro: A+Ox3    Lab, Imaging and other studies: I have personally reviewed pertinent reports      VTE Pharmacologic Prophylaxis: Heparin  VTE Mechanical Prophylaxis: sequential compression device

## 2021-03-17 NOTE — PLAN OF CARE
Problem: PHYSICAL THERAPY ADULT  Goal: Performs mobility at highest level of function for planned discharge setting  See evaluation for individualized goals  Description: Treatment/Interventions: LE strengthening/ROM, Elevations, Therapeutic exercise, Endurance training, Gait training, Patient/family training          See flowsheet documentation for full assessment, interventions and recommendations  Outcome: Progressing  Note: Prognosis: Good  Problem List: Decreased strength, Decreased range of motion, Decreased endurance, Impaired balance, Decreased mobility, Decreased safety awareness, Pain, Decreased skin integrity  Assessment: Pt was diagnosed with a BRCA 2 mutation many years ago  Dx: BRCA2 gene mutation positive  3/15/21 bilateral breast nipple sparing mastectomy  order placed for PT eval and tx, w/ activity order of up in chair  pt presents w/ comorbidities of HTN and bilateral carpal tunnel release and personal factors of living in 2 story house, stair(s) to enter home and anxiety  pt presents w/ pain, weakness, decreased ROM, decreased endurance, impaired balance, gait deviations, decreased safety awareness, fall risk and impaired skin integrity  these impairments are evident in findings from physical examination (weakness, decreased ROM and impaired skin integrity), mobility assessment (need for standby assist w/ ambulation when usually mobilizing independently, tolerance to only 150 feet of ambulation and need for cueing for mobility technique), and Barthel Index: 80/100 and Gait Speed: 0 47 m/s (less than 1 0 m/s indicates need for intervention to address fall risk)  pt needed input for task focus and mobility technique  pt is at risk for falls due to physical and safety awareness deficits   pt's clinical presentation is unstable/unpredictable (evident in poor blood pressure control, tolerance to only 150 feet of ambulation, pain impacting overall mobility status and need for input for mobility technique)  pt needs inpatient PT tx to improve mobility deficits and progress mobility training as appropriate  discharge recommendation is for home w/ family support to reduce fall risk and maximize level of functional independence  PT Discharge Recommendation: Return to previous environment with social support          See flowsheet documentation for full assessment

## 2021-03-18 PROCEDURE — NC001 PR NO CHARGE: Performed by: PLASTIC SURGERY

## 2021-03-18 RX ORDER — GINSENG 100 MG
1 CAPSULE ORAL 2 TIMES DAILY
Status: DISCONTINUED | OUTPATIENT
Start: 2021-03-18 | End: 2021-03-18

## 2021-03-18 RX ADMIN — GABAPENTIN 300 MG: 300 CAPSULE ORAL at 20:00

## 2021-03-18 RX ADMIN — DIPHENHYDRAMINE HYDROCHLORIDE 25 MG: 50 INJECTION, SOLUTION INTRAMUSCULAR; INTRAVENOUS at 00:00

## 2021-03-18 RX ADMIN — GABAPENTIN 300 MG: 300 CAPSULE ORAL at 10:03

## 2021-03-18 RX ADMIN — GABAPENTIN 300 MG: 300 CAPSULE ORAL at 16:30

## 2021-03-18 RX ADMIN — OXYCODONE HYDROCHLORIDE 10 MG: 10 TABLET ORAL at 20:00

## 2021-03-18 RX ADMIN — SERTRALINE HYDROCHLORIDE 50 MG: 50 TABLET ORAL at 10:03

## 2021-03-18 RX ADMIN — ACETAMINOPHEN 975 MG: 325 TABLET, FILM COATED ORAL at 13:29

## 2021-03-18 RX ADMIN — SENNOSIDES AND DOCUSATE SODIUM 1 TABLET: 8.6; 5 TABLET ORAL at 23:00

## 2021-03-18 RX ADMIN — IBUPROFEN 800 MG: 400 TABLET ORAL at 23:00

## 2021-03-18 RX ADMIN — ACETAMINOPHEN 975 MG: 325 TABLET, FILM COATED ORAL at 05:31

## 2021-03-18 RX ADMIN — LORATADINE 10 MG: 10 TABLET ORAL at 09:58

## 2021-03-18 RX ADMIN — OXYCODONE 5 MG: 5 TABLET ORAL at 10:03

## 2021-03-18 RX ADMIN — ENOXAPARIN SODIUM 90 MG: 100 INJECTION SUBCUTANEOUS at 09:58

## 2021-03-18 RX ADMIN — METOPROLOL TARTRATE 50 MG: 50 TABLET, FILM COATED ORAL at 09:58

## 2021-03-18 RX ADMIN — LISINOPRIL 10 MG: 10 TABLET ORAL at 10:03

## 2021-03-18 RX ADMIN — IBUPROFEN 800 MG: 400 TABLET ORAL at 13:29

## 2021-03-18 RX ADMIN — ASPIRIN 325 MG ORAL TABLET 325 MG: 325 PILL ORAL at 10:03

## 2021-03-18 RX ADMIN — BACITRACIN ZINC 1 SMALL APPLICATION: 500 OINTMENT TOPICAL at 10:03

## 2021-03-18 RX ADMIN — CYCLOBENZAPRINE HYDROCHLORIDE 10 MG: 10 TABLET, FILM COATED ORAL at 16:47

## 2021-03-18 RX ADMIN — ACETAMINOPHEN 975 MG: 325 TABLET, FILM COATED ORAL at 22:00

## 2021-03-18 RX ADMIN — IBUPROFEN 800 MG: 400 TABLET ORAL at 05:31

## 2021-03-18 NOTE — CASE MANAGEMENT
CM met with pt at bedside to discuss VNA acceptance at DC  Pt is accepted by [facility]  Pt in agreement  Pt has transport home  CM will continue to follow and assist with DC needs

## 2021-03-18 NOTE — PROGRESS NOTES
Progress Note - General Surgery   Steven Rush 48 y o  female MRN: [id number]  Unit/Bed#: ICU 09 Encounter: [id number]    Assessment:  50F, BRCA2 positive, s/p POD3 bilateral nipple sparing mastectomy (Dr Yvette Londono) with immediate reconstruction with JAMES flap (Dr Ashlie Wells)  Doing well  Plan:  - general diet  - SLIV  - Q4 flap check, Q4 after 48 hours  - THERON drain care  - VNA set up  - no pressure over the flap  - cont Prevena vac  - bacitracin BID to the nasal abrasion  - lovenox while in house  - ASA  - encourage ambulation  - home today or tomorrow; has an appointment for vac removal at [facility] on Monday, 3/22/21 at 930AM    Subjective/Objective     Subjective: Had migraine overnight  Received cocktail treatment  A little concerned with an abrasion over the nasal bridge  Objective:     Blood pressure 140/71, pulse 82, temperature 98 °F (36 7 °C), temperature source Oral, resp  rate 19, height 5' 4" (1 626 m), weight 53 5 kg (117 lb 15 1 oz), SpO2 97 %  ,Body mass index is 20 25 kg/m²        Intake/Output Summary (Last 24 hours) at 3/18/2021 0541  Last data filed at 3/18/2021 0400  Gross per 24 hour   Intake 1289 ml   Output 3210 ml   Net -1921 ml       Invasive Devices     Peripheral Intravenous Line            Peripheral IV 03/15/21 Right Hand 2 days          Drain            Closed/Suction Drain Left;Lateral Abdomen Bulb 15 Fr  2 days    Closed/Suction Drain Left;Lateral Chest Bulb 15 Fr  2 days    Closed/Suction Drain Right;Lateral Abdomen Bulb 15 Fr  2 days    Closed/Suction Drain Right;Lateral Chest Bulb 15 Fr  2 days    Negative Pressure Wound Therapy (V A C ) Abdomen 2 days                GEN: NAD  CV: regular rate  PULM: non-labored breathing  Breast: bilateral mastectomy with mild ecchymosis, flaps soft, skin paddles viable, warm, 2s cap refill, Vioptix R 68% L 71%, THERON serosanguinous, good flow  venous signal and doppler arterial signals, no hematoma  Abd: soft, non-distended, appropriately tender, Prevena in place, THERON x2 serosanguinous     Lab, Imaging and other studies:  CBC:   No results found for: WBC, HGB, HCT, MCV, PLT, ADJUSTEDWBC, MCH, MCHC, RDW, MPV, NRBC, CMP:   No results found for: SODIUM, K, CL, CO2, ANIONGAP, BUN, CREATININE, GLUCOSE, CALCIUM, AST, ALT, ALKPHOS, PROT, BILITOT, EGFR, Coagulation: No results found for: PT, INR, APTT  VTE Pharmacologic Prophylaxis: Enoxaparin (Lovenox)  VTE Mechanical Prophylaxis: sequential compression device

## 2021-03-19 VITALS
HEART RATE: 110 BPM | WEIGHT: 201.94 LBS | RESPIRATION RATE: 18 BRPM | OXYGEN SATURATION: 95 % | SYSTOLIC BLOOD PRESSURE: 133 MMHG | BODY MASS INDEX: 34.48 KG/M2 | HEIGHT: 64 IN | DIASTOLIC BLOOD PRESSURE: 88 MMHG | TEMPERATURE: 98.1 F

## 2021-03-19 PROCEDURE — 99024 POSTOP FOLLOW-UP VISIT: CPT | Performed by: PLASTIC SURGERY

## 2021-03-19 RX ADMIN — ASPIRIN 325 MG ORAL TABLET 325 MG: 325 PILL ORAL at 09:51

## 2021-03-19 RX ADMIN — LISINOPRIL 10 MG: 10 TABLET ORAL at 09:50

## 2021-03-19 RX ADMIN — ACETAMINOPHEN 975 MG: 325 TABLET, FILM COATED ORAL at 04:39

## 2021-03-19 RX ADMIN — LORATADINE 10 MG: 10 TABLET ORAL at 09:50

## 2021-03-19 RX ADMIN — CYCLOBENZAPRINE HYDROCHLORIDE 10 MG: 10 TABLET, FILM COATED ORAL at 04:34

## 2021-03-19 RX ADMIN — METOPROLOL TARTRATE 50 MG: 50 TABLET, FILM COATED ORAL at 09:51

## 2021-03-19 RX ADMIN — ENOXAPARIN SODIUM 90 MG: 100 INJECTION SUBCUTANEOUS at 09:51

## 2021-03-19 RX ADMIN — IBUPROFEN 800 MG: 400 TABLET ORAL at 09:27

## 2021-03-19 RX ADMIN — SERTRALINE HYDROCHLORIDE 50 MG: 50 TABLET ORAL at 09:51

## 2021-03-19 RX ADMIN — HYDROMORPHONE HYDROCHLORIDE 0.5 MG: 1 INJECTION, SOLUTION INTRAMUSCULAR; INTRAVENOUS; SUBCUTANEOUS at 04:35

## 2021-03-19 RX ADMIN — ACETAMINOPHEN 975 MG: 325 TABLET, FILM COATED ORAL at 12:34

## 2021-03-19 RX ADMIN — GABAPENTIN 300 MG: 300 CAPSULE ORAL at 09:54

## 2021-03-19 NOTE — PROGRESS NOTES
Progress Note - Plastic Surgery   Soni Gunderson 48 y o  female MRN: [id number]  Unit/Bed#: S -26 Encounter: [id number]    Assessment:  50F, BRCA2 positive, s/p POD4 bilateral nipple sparing mastectomy (Dr Benigno Cano) with immediate reconstruction with JAMES flap (Dr Jones Jones)  Doing well  The patient was found to have decrease in Hg post-operative (from 14 6 preop to 9 6 post-op), consistent with acute blood loss anemia due to surgery  However, the patient remained asympatomatic  Plan:  - general diet  - SLIV  - Q4 flap check  - THERON drain care; VNA is set up  - no pressure over the flap  - cont Prevena vac  - bacitracin BID to the nasal abrasion  - lovenox while in house  - ASA  - encourage ambulation  - d/c home today  - has an appointment for vac removal at [facility] on Monday, 3/22/21 at 930AM    Subjective/Objective     Subjective:   No acute overnight event  Migraine resolved  Objective:     Blood pressure 139/80, pulse 91, temperature 97 7 °F (36 5 °C), temperature source Oral, resp  rate 18, height 5' 4" (1 626 m), weight 91 6 kg (201 lb 15 1 oz), SpO2 97 %  ,Body mass index is 34 66 kg/m²        Intake/Output Summary (Last 24 hours) at 3/19/2021 0718  Last data filed at 3/18/2021 2300  Gross per 24 hour   Intake 480 ml   Output 750 ml   Net -270 ml       Invasive Devices     Peripheral Intravenous Line            Peripheral IV 03/15/21 Right Hand 4 days          Drain            Closed/Suction Drain Left;Lateral Abdomen Bulb 15 Fr  3 days    Closed/Suction Drain Left;Lateral Chest Bulb 15 Fr  3 days    Closed/Suction Drain Right;Lateral Abdomen Bulb 15 Fr  3 days    Closed/Suction Drain Right;Lateral Chest Bulb 15 Fr  3 days    Negative Pressure Wound Therapy (V A C ) Abdomen 3 days                GEN: NAD  CV: regular rate  PULM: non-labored breathing  Breast: bilateral mastectomy with mild ecchymosis, flaps soft, skin paddles viable, warm, 2s cap refill, Vioptix R 74% L 68%, THERON serosanguinous (R 20, L 30 ml), good flow  venous signal and doppler arterial signals, no hematoma  Abd: soft, non-distended, appropriately tender, Prevena in place, THERON x2 serosanguinous (R 25, L 120 ml)    Lab, Imaging and other studies:  CBC:   No results found for: WBC, HGB, HCT, MCV, PLT, ADJUSTEDWBC, MCH, MCHC, RDW, MPV, NRBC, CMP:   No results found for: SODIUM, K, CL, CO2, ANIONGAP, BUN, CREATININE, GLUCOSE, CALCIUM, AST, ALT, ALKPHOS, PROT, BILITOT, EGFR, Coagulation: No results found for: PT, INR, APTT  VTE Pharmacologic Prophylaxis: Enoxaparin (Lovenox)  VTE Mechanical Prophylaxis: sequential compression device

## 2021-03-19 NOTE — CASE MANAGEMENT
Pt is to be discharged to home today  [facility] will be following for [facility]  Cm has updated [facility] that pt will be discharged home today  Cm sent AVS and F2F

## 2021-03-19 NOTE — DISCHARGE SUMMARY
Discharge Summary - Nghia Lara 48 y o  female MRN: [id number]    Unit/Bed#: S -94 Encounter: [id number]    Admission Date:   Admission Orders (From admission, onward)     Ordered        03/15/21 2103  Inpatient Admission  Once                     Admitting Diagnosis: BRCA2 gene mutation positive [Z15 01, Z15 09]    HPI (21 by Dr Margaux Maldonado) :   Mrs Adama Hi is a 48years old female who presents for breast reconstruction consultation       Patient manifest that she has history of BRCA 2 genetic mutation for which she underwent prophylactic hysterectomy and bilateral salpingo oophorectomy in   She has been followed by breast surgery team with routine exams, imaging including Mammograms and MRIs with no suspicions lesions  She has been counseled about a prophylactic mastectomy given high risk of cancer development  She recently had a left breast cyst and was treated with drainage and abx and has recovered       She has family history of BRCA in her mother, two sisters and her daughter  She has Fhx of cancer in her maternal grandmother at age 45 and in one sister at age 43- 45 for which underwent mastectomy with implant reconstruction, but had multiple complications with implants  Patient is      Procedures Performed:  Bilateral nipple sparing mastectomy - Dr Ian Chinchilla  Immediate reconstruction with bilateral JAMES free flap and Prevena placement - Dr Margaux Maldonado    Summary of Hospital Course: The patient underwent an uncomplicated bilateral nipple sparing mastectomy and immediate reconstruction with JAMES flap on 03/15/2021  The patient was admitted to ICU postoperatively for frequent flap checks  There was no signs of flap compromise throughout the hospitalization  On POD1, the patient's arterial line, Davis were removed  She was downgraded to step-down 1  The patient tolerated general diet  On POD3, patient was further downgraded to regular med surge floor    On POD4, the patient was discharged home in stable condition  Flow  wires were removed prior to discharge  Significant Findings, Care, Treatment and Services Provided:   ICU for frequent flap monitoring    Complications:   None    Discharge Diagnosis:   BRCA2 gene mutation positive  Increase risk to breast cancer    Resolved Problems  Date Reviewed: 3/11/2021    None          Condition at Discharge: good         Discharge instructions/Information to patient and family:   See after visit summary for information provided to patient and family  Provisions for Follow-Up Care:  See after visit summary for information related to follow-up care and any pertinent home health orders  PCP: Ryan Mcdaniels MD    Disposition: Home    Planned Readmission: No      Discharge Statement   I spent 30 minutes discharging the patient  This time was spent on the day of discharge  I had direct contact with the patient on the day of discharge  Additional documentation is required if more than 30 minutes were spent on discharge  Discharge Medications:  See after visit summary for reconciled discharge medications provided to patient and family

## 2021-03-19 NOTE — DISCHARGE INSTRUCTIONS
[facility]'s Plastic and Reconstructive Surgery    Dr Tobias Mer  [address] [facility], [address]  Phone: [phone]     Postoperative Instructions for Surgery     These instructions are being provided by your doctor to give you basic guidelines during your post-op recovery  Please let our office know if your contact information has changed  You have an appointment with the clinic nurse at 930AM on Monday, 3/22/21, for vac removal      Dressings:   1  Keep your dressings on at all time until follow up   2  You may reinforce dressing over the drain sites if drainage is noted  3  Apply Bacitracin BID to your nose abrasion  Activity Restrictions:   1  Do not lift over 5lbs for 6 weeks  2  Avoid strenuous activities  3  Avoid driving until cleared by Dr Yoon Median  4  You may perform light activity of daily living  Bathin  Do not soak your incisions in water until your drains are removed  2  You may shower after the vac is removed  3  In the mean time, you may sponge bathe  Medications:   1  Resume pre-op medications  2  You may take tylenol, aleve, or ibuprofen for pain control  3  If needed, you also have oxycodone for severe post-operative pain  4  Do not exceed 4 grams of acetaminophen/Tylenol in 24 hours  5  You may use OTC stool softener or laxative when you use narcotic medication to prevent constipation or straining during bowel movement  Other:   1  Strip, empty, and record the drain output twice a day  2  If you have further questions or concerns, please call the office

## 2021-03-22 ENCOUNTER — OFFICE VISIT (OUTPATIENT)
Dept: PLASTIC SURGERY | Facility: CLINIC | Age: 51
End: 2021-03-22

## 2021-03-22 VITALS — TEMPERATURE: 97.6 F

## 2021-03-22 DIAGNOSIS — Z98.890 POST-OPERATIVE STATE: Primary | ICD-10-CM

## 2021-03-22 PROCEDURE — 99024 POSTOP FOLLOW-UP VISIT: CPT

## 2021-03-22 NOTE — PROGRESS NOTES
Krunal Saucedo was seen in the office today for the first post op visit following the breast nipple sparing mastectomy, immediate breast reconstruction with free flap and prevena placement on 3/15/21  The prevena wound vac was removed  The abdominal incision well aproximated, clean, dry and intact  The bilateral  breast incisions dry, intact and clean  The breasts bilaterally viable with good perfusion and bruised  The pictures were taken  Patient reported the output from her 3 drains to be above 50 cc in the last three days( serosang  Color)  One drain, right abdominal one was removed as the output didn't exceed 15 cc in 3 consecutive days  The abdominal incision and breast incisions were padded with abdominal pads for comfort and protection  Patient is to return to our office for another drain check on Friday  All questions were answered

## 2021-03-23 NOTE — UTILIZATION REVIEW
Notification of Discharge  This is a Notification of Discharge from our facility [address]  Please be advised that this patient has been discharge from our facility  Below you will find the admission and discharge date and time including the patients disposition  PRESENTATION DATE: 3/15/2021  6:46 AM  OBS ADMISSION DATE:   IP ADMISSION DATE: 3/15/21 2103   DISCHARGE DATE: 3/19/2021  3:41 PM  DISPOSITION: Home with [facility] with [facility]   Admission Orders listed below:  Admission Orders (From admission, onward)     Ordered        03/15/21 2103  Inpatient Admission  Once                   Please contact the UR Department if additional information is required to close this patient's authorization/case  [address] Anil Pollock [facility] Utilization Review Department  Main: [phone] x carefully listen to the prompts  All voicemails are confidential   [email or web address]  Send all requests for admission clinical reviews, approved or denied determinations and any other requests to dedicated fax number below belonging to the campus where the patient is receiving treatment   List of dedicated fax numbers:  [address] DENIALS (Administrative/Medical Necessity) [phone]   [address] (Maternity/NICU/Pediatrics) [phone]   Rogers Silva [phone]   [facility] [phone]   [facility] [phone]   [facility] 1525 [facility] [phone]   1101 [facility] [phone]   2209 [facility]  2401 [facility] 1000 [address] [facility] [phone]

## 2021-03-26 ENCOUNTER — OFFICE VISIT (OUTPATIENT)
Dept: PLASTIC SURGERY | Facility: CLINIC | Age: 51
End: 2021-03-26

## 2021-03-26 VITALS — TEMPERATURE: 97.7 F

## 2021-03-26 DIAGNOSIS — Z98.890 POST-OPERATIVE STATE: Primary | ICD-10-CM

## 2021-03-26 PROCEDURE — 99024 POSTOP FOLLOW-UP VISIT: CPT

## 2021-03-29 ENCOUNTER — OFFICE VISIT (OUTPATIENT)
Dept: PLASTIC SURGERY | Facility: CLINIC | Age: 51
End: 2021-03-29

## 2021-03-29 ENCOUNTER — OFFICE VISIT (OUTPATIENT)
Dept: SURGICAL ONCOLOGY | Facility: CLINIC | Age: 51
End: 2021-03-29

## 2021-03-29 VITALS — HEIGHT: 64 IN | WEIGHT: 197 LBS | BODY MASS INDEX: 33.63 KG/M2 | TEMPERATURE: 96.9 F

## 2021-03-29 VITALS
BODY MASS INDEX: 33.29 KG/M2 | DIASTOLIC BLOOD PRESSURE: 80 MMHG | HEIGHT: 64 IN | HEART RATE: 86 BPM | TEMPERATURE: 98 F | RESPIRATION RATE: 18 BRPM | WEIGHT: 195 LBS | SYSTOLIC BLOOD PRESSURE: 122 MMHG

## 2021-03-29 DIAGNOSIS — Z15.01 BRCA2 GENE MUTATION POSITIVE: ICD-10-CM

## 2021-03-29 DIAGNOSIS — Z91.89 INCREASED RISK OF BREAST CANCER: ICD-10-CM

## 2021-03-29 DIAGNOSIS — Z98.890 S/P FLAP GRAFT: Primary | ICD-10-CM

## 2021-03-29 DIAGNOSIS — Z90.13 H/O BILATERAL MASTECTOMY: ICD-10-CM

## 2021-03-29 DIAGNOSIS — Z90.13 STATUS POST BILATERAL MASTECTOMY: Primary | ICD-10-CM

## 2021-03-29 DIAGNOSIS — Z15.09 BRCA2 GENE MUTATION POSITIVE: ICD-10-CM

## 2021-03-29 PROCEDURE — 99024 POSTOP FOLLOW-UP VISIT: CPT | Performed by: SURGERY

## 2021-03-29 PROCEDURE — 99024 POSTOP FOLLOW-UP VISIT: CPT | Performed by: PHYSICIAN ASSISTANT

## 2021-03-29 NOTE — PROGRESS NOTES
Patient presented to the office today for the post op visit following the breast nipple sparing mastectomy, immediate breast reconstruction with free flap on 3/15  One of the drains was removed, as the drain output was below 20 cc in 3 consecutive days  Patient is to follow up in our office in a few days for another check up  All questions were answered

## 2021-03-29 NOTE — PROGRESS NOTES
POST-OP EVALUATION  3/29/2021    Subjective   Classkiera Phelan is a 48 y o  female is here today for routine post-operative follow up    03/15/21 0750         Procedures:    Mari Inches  BREAST NIPPLE SPARING MATECTOMY (Bilateral Breast)       BREAST IMMEDIATE RECONSTRUCTION WITH FLAP FREE DEEP INFERIOR EPIGASTRIC  (JAMES); EXCISION RIB (Bilateral Back)       PREVENA PLACEMENT (N/A Abdomen)     Pt feels well  She has some discomfort but has not needed much pain medication      Past Medical History:   Diagnosis Date    Anxiety     Hypertension     Migraines      Past Surgical History:   Procedure Laterality Date    CARPAL TUNNEL RELEASE Bilateral 12/2017    HYSTERECTOMY      LAPAROSCOPIC TOTAL HYSTERECTOMY      OOPHORECTOMY Bilateral 02/27/2015    CT BREAST RECONSTRUCTION W/FREE FLAP Bilateral 3/15/2021    Procedure: BREAST IMMEDIATE RECONSTRUCTION WITH FLAP FREE DEEP INFERIOR EPIGASTRIC  (JAMES); EXCISION RIB;  Surgeon: Susan Rouse MD;  [facility];  Service: Plastics    CT MASTECTOMY, SIMPLE, COMPLETE Bilateral 3/15/2021    Procedure: BREAST NIPPLE Orin Socks;  Surgeon: Rk Cole MD;  [facility];  Service: Surgical Oncology    VAC DRESSING APPLICATION N/A 1/61/4362    Procedure: Shawna Barrios;  Surgeon: Susan Rouse MD;  [facility];  Service: Plastics        Current Outpatient Medications:     acetaminophen (TYLENOL) 500 mg tablet, Take 2 tablets (1,000 mg total) by mouth 3 (three) times a day, Disp: 60 tablet, Rfl: 0    ALPRAZolam (XANAX) 0 25 mg tablet, Take 0 25 mg by mouth as needed for anxiety, Disp: , Rfl:     aspirin (ECOTRIN) 325 mg EC tablet, Take 1 tablet (325 mg total) by mouth daily, Disp: 30 tablet, Rfl: 0    CALCIUM PO, Take 600 mg by mouth every morning , Disp: , Rfl:     cetirizine (ZyrTEC) 10 mg tablet, Take 10 mg by mouth as needed , Disp: , Rfl:     clindamycin (CLEOCIN T) 1 % lotion, APPLY TO AFFECTED AREA TWICE A DAY, Disp: , Rfl:     cyclobenzaprine (FLEXERIL) 10 mg tablet, Take 10 mg by mouth as needed for muscle spasms, Disp: , Rfl:     gabapentin (NEURONTIN) 300 mg capsule, Take 1 capsule (300 mg total) by mouth 3 (three) times a day for 7 days, Disp: 21 capsule, Rfl: 0    HYDROcodone-acetaminophen (NORCO) 7 5-325 mg per tablet, Take 1 tablet by mouth as needed for pain, Disp: , Rfl:     ibuprofen (MOTRIN) 800 mg tablet, Take 1 tablet (800 mg total) by mouth every 8 (eight) hours for 21 days, Disp: 60 tablet, Rfl: 0    lisinopril (ZESTRIL) 10 mg tablet, Take 10 mg by mouth every morning , Disp: , Rfl:     metoprolol tartrate (LOPRESSOR) 50 mg tablet, Take 50 mg by mouth every morning , Disp: , Rfl:     Multiple Vitamin (multivitamin) capsule, Take by mouth, Disp: , Rfl:     naloxone (NARCAN) 4 mg/0 1 mL nasal spray, Administer 1 spray into a nostril  If no response after 2-3 minutes, give another dose in the other nostril using a new spray , Disp: 1 each, Rfl: 0    oxyCODONE (ROXICODONE) 5 mg immediate release tablet, Take 1 tablet (5 mg total) by mouth every 6 (six) hours as needed for severe pain for up to 25 dosesMax Daily Amount: 20 mg, Disp: 25 tablet, Rfl: 0    sertraline (ZOLOFT) 50 mg tablet, Take 50 mg by mouth every morning , Disp: , Rfl:     valACYclovir (VALTREX) 1,000 mg tablet, Take 1,000 mg by mouth as needed , Disp: , Rfl:   Allergies: Amoxicillin-pot clavulanate    Objective    Incisions are intact  Surgical glue covers most of incisions  Flaps have good capillary refill  Breasts are soft, no induration  Left breast with some ecchymosis  Abdomen is soft, nontender  Umbilicus intact  Assessment/Plan   Diagnoses and all orders for this visit:    S/P b/l JAMES flaps    H/O bilateral nipple sparing mastectomy    Patient is doing very well  Continue with surgical bra, binder  Bacitracin to incisions  Followup in 2 weeks          Nesha Mendiola PA-C

## 2021-03-29 NOTE — PROGRESS NOTES
Surgical Oncology Follow Up       [address] [place] [address],[address] [facility]  [facility] [place]  [address]  [facility] [address]    Tabatha Conrad  1970  [id number]  8850 [facility],[address] [place] [address]  [facility] [place]  [address] [address]    Chief Complaint   Patient presents with    Post-op          Assessment & Plan:   Patient presents for postoperative visit  She has undergone bilateral Macarena flaps  She has a very good cosmetic outcome  She was resected from inframammary approach  Her breast drains have been removed  She has a single drain remaining from her abdomen  We will see her back in 6 months  She is agreeable to see our advanced practitioner at that time  We will review further follow-up at that visit  Cancer History:     Oncology History    No history exists  Interval History:     See above    Review of Systems:   Review of Systems   All other systems reviewed and are negative        Past Medical History     Patient Active Problem List   Diagnosis    Increased risk of breast cancer    BRCA2 gene mutation positive    Skin cyst    Anxiety    Headache    HTN (hypertension)     Past Medical History:   Diagnosis Date    Anxiety     Hypertension     Migraines      Past Surgical History:   Procedure Laterality Date    CARPAL TUNNEL RELEASE Bilateral 12/2017    HYSTERECTOMY      LAPAROSCOPIC TOTAL HYSTERECTOMY      OOPHORECTOMY Bilateral 02/27/2015    DE BREAST RECONSTRUCTION W/FREE FLAP Bilateral 3/15/2021    Procedure: BREAST IMMEDIATE RECONSTRUCTION WITH FLAP FREE DEEP INFERIOR EPIGASTRIC  (MACARENA); EXCISION RIB;  Surgeon: Norma Rogel MD;  [facility];  Service: Plastics    DE MASTECTOMY, SIMPLE, COMPLETE Bilateral 3/15/2021    Procedure: BREAST NIPPLE Lukeville Bang;  Surgeon: Mery Brambila MD;  [facility];  Service: Surgical Oncology    VAC DRESSING APPLICATION N/A 3/15/2021    Procedure: Scarlet Camper;  Surgeon: Maria Esther Reyes MD;  [facility];  Service: Plastics     Family History   Problem Relation Age of Onset    Ovarian cancer Mother 76    BRCA2 Positive Mother     Breast cancer Sister 40    BRCA2 Positive Sister     BRCA2 Positive Daughter     Breast cancer Maternal Grandmother 45    BRCA2 Positive Sister      Social History     Socioeconomic History    Marital status:      Spouse name: Not on file    Number of children: Not on file    Years of education: Not on file    Highest education level: Not on file   Occupational History    Not on file   Social Needs    Financial resource strain: Not on file    Food insecurity     Worry: Not on file     Inability: Not on file    Transportation needs     Medical: Not on file     Non-medical: Not on file   Tobacco Use    Smoking status: Never Smoker    Smokeless tobacco: Never Used   Substance and Sexual Activity    Alcohol use: Yes     Frequency: 2-3 times a week     Drinks per session: 1 or 2    Drug use: Never    Sexual activity: Not on file   Lifestyle    Physical activity     Days per week: Not on file     Minutes per session: Not on file    Stress: Not on file   Relationships    Social connections     Talks on phone: Not on file     Gets together: Not on file     Attends Christianity service: Not on file     Active member of club or organization: Not on file     Attends meetings of clubs or organizations: Not on file     Relationship status: Not on file    Intimate partner violence     Fear of current or ex partner: Not on file     Emotionally abused: Not on file     Physically abused: Not on file     Forced sexual activity: Not on file   Other Topics Concern    Not on file   Social History Narrative    Not on file       Current Outpatient Medications:     acetaminophen (TYLENOL) 500 mg tablet, Take 2 tablets (1,000 mg total) by mouth 3 (three) times a day, Disp: 61 tablet, Rfl: 0    ALPRAZolam (XANAX) 0 25 mg tablet, Take 0 25 mg by mouth as needed for anxiety, Disp: , Rfl:     aspirin (ECOTRIN) 325 mg EC tablet, Take 1 tablet (325 mg total) by mouth daily, Disp: 30 tablet, Rfl: 0    CALCIUM PO, Take 600 mg by mouth every morning , Disp: , Rfl:     cetirizine (ZyrTEC) 10 mg tablet, Take 10 mg by mouth as needed , Disp: , Rfl:     clindamycin (CLEOCIN T) 1 % lotion, APPLY TO AFFECTED AREA TWICE A DAY, Disp: , Rfl:     cyclobenzaprine (FLEXERIL) 10 mg tablet, Take 10 mg by mouth as needed for muscle spasms, Disp: , Rfl:     HYDROcodone-acetaminophen (NORCO) 7 5-325 mg per tablet, Take 1 tablet by mouth as needed for pain, Disp: , Rfl:     ibuprofen (MOTRIN) 800 mg tablet, Take 1 tablet (800 mg total) by mouth every 8 (eight) hours for 21 days, Disp: 60 tablet, Rfl: 0    lisinopril (ZESTRIL) 10 mg tablet, Take 10 mg by mouth every morning , Disp: , Rfl:     metoprolol tartrate (LOPRESSOR) 50 mg tablet, Take 50 mg by mouth every morning , Disp: , Rfl:     Multiple Vitamin (multivitamin) capsule, Take by mouth, Disp: , Rfl:     naloxone (NARCAN) 4 mg/0 1 mL nasal spray, Administer 1 spray into a nostril   If no response after 2-3 minutes, give another dose in the other nostril using a new spray , Disp: 1 each, Rfl: 0    oxyCODONE (ROXICODONE) 5 mg immediate release tablet, Take 1 tablet (5 mg total) by mouth every 6 (six) hours as needed for severe pain for up to 25 dosesMax Daily Amount: 20 mg, Disp: 25 tablet, Rfl: 0    sertraline (ZOLOFT) 50 mg tablet, Take 50 mg by mouth every morning , Disp: , Rfl:     valACYclovir (VALTREX) 1,000 mg tablet, Take 1,000 mg by mouth as needed , Disp: , Rfl:     gabapentin (NEURONTIN) 300 mg capsule, Take 1 capsule (300 mg total) by mouth 3 (three) times a day for 7 days, Disp: 21 capsule, Rfl: 0  Allergies   Allergen Reactions    Amoxicillin-Pot Clavulanate Diarrhea       Physical Exam:     Vitals:    03/29/21 0814   BP: 122/80 Pulse: 86   Resp: 18   Temp: 98 °F (36 7 °C)     Physical Exam  Chest:      Comments:   Examination of both reconstructed breast demonstrate no evidence of infection hematoma or seroma  She does have a small amount of epidermolysis on the left side which is minimal   This should heal without difficulty  She will see Dr Gideon Patiño later today  Results & Discussion:     The patient's pathology demonstrated atypical lobular hyperplasia bilaterally  There is no evidence of cancer  This was reviewed with her  Follow-up was reviewed and discussed  We will see her back in 6 months  She is agreeable to see our advanced practitioner  Advance Care Planning/Advance Directives:  I discussed the disease status, treatment plans and follow-up with the patient

## 2021-04-05 ENCOUNTER — OFFICE VISIT (OUTPATIENT)
Dept: PLASTIC SURGERY | Facility: CLINIC | Age: 51
End: 2021-04-05

## 2021-04-05 VITALS — TEMPERATURE: 97.8 F

## 2021-04-05 DIAGNOSIS — Z98.890 POST-OPERATIVE STATE: Primary | ICD-10-CM

## 2021-04-05 PROCEDURE — 99024 POSTOP FOLLOW-UP VISIT: CPT

## 2021-04-05 NOTE — PROGRESS NOTES
Maribel Koenigfracisco was seen in the office today for the post op visit following the breast nipple sparing mastectomy, immediate breast reconstruction with free flap on 3/15/21  The last drain was removed today  The reported output in the last 4 days at 20 cc)  The incisions clean, dry and intact  The left flap with the small amount of black eschar  The umbilical area with black eschar  The pictures were taken  Maribel Rose is to return to our office in a week for another follow up  All questions were answered

## 2021-04-12 ENCOUNTER — OFFICE VISIT (OUTPATIENT)
Dept: PLASTIC SURGERY | Facility: CLINIC | Age: 51
End: 2021-04-12

## 2021-04-12 VITALS — TEMPERATURE: 97.1 F | BODY MASS INDEX: 33.63 KG/M2 | WEIGHT: 197 LBS | HEIGHT: 64 IN

## 2021-04-12 DIAGNOSIS — Z98.890 S/P FLAP GRAFT: ICD-10-CM

## 2021-04-12 DIAGNOSIS — Z90.13 H/O BILATERAL MASTECTOMY: Primary | ICD-10-CM

## 2021-04-12 PROCEDURE — 99024 POSTOP FOLLOW-UP VISIT: CPT | Performed by: PHYSICIAN ASSISTANT

## 2021-04-12 NOTE — PROGRESS NOTES
POST-OP EVALUATION  4/12/2021    Jatin Mayfield is a 48 y o  female is here today for routine post-operative follow up   03/15/21 0750               Procedures:        BREAST NIPPLE SPARING MATECTOMY (Bilateral Breast)       BREAST IMMEDIATE RECONSTRUCTION WITH FLAP FREE DEEP INFERIOR EPIGASTRIC  (JAMES); Pt feels ok to day  Minimal pain  She has questions about her left breast healing  She is eating, and is ready for increased activity        Past Medical History:   Diagnosis Date    Anxiety     Hypertension     Migraines      Past Surgical History:   Procedure Laterality Date    CARPAL TUNNEL RELEASE Bilateral 12/2017    HYSTERECTOMY      LAPAROSCOPIC TOTAL HYSTERECTOMY      OOPHORECTOMY Bilateral 02/27/2015    NC BREAST RECONSTRUCTION W/FREE FLAP Bilateral 3/15/2021    Procedure: BREAST IMMEDIATE RECONSTRUCTION WITH FLAP FREE DEEP INFERIOR EPIGASTRIC  (JAMES); EXCISION RIB;  Surgeon: Radha Trevino MD;  [facility];  Service: Plastics    NC MASTECTOMY, SIMPLE, COMPLETE Bilateral 3/15/2021    Procedure: BREAST NIPPLE Alma Deisi;  Surgeon: Jr Fernandez MD;  [facility];  Service: Surgical Oncology    VAC DRESSING APPLICATION N/A 6/30/6922    Procedure: Lm Fort;  Surgeon: Radha Trevino MD;  [facility];  Service: Plastics        Current Outpatient Medications:     acetaminophen (TYLENOL) 500 mg tablet, Take 2 tablets (1,000 mg total) by mouth 3 (three) times a day, Disp: 60 tablet, Rfl: 0    ALPRAZolam (XANAX) 0 25 mg tablet, Take 0 25 mg by mouth as needed for anxiety, Disp: , Rfl:     aspirin (ECOTRIN) 325 mg EC tablet, Take 1 tablet (325 mg total) by mouth daily, Disp: 30 tablet, Rfl: 0    CALCIUM PO, Take 600 mg by mouth every morning , Disp: , Rfl:     cetirizine (ZyrTEC) 10 mg tablet, Take 10 mg by mouth as needed , Disp: , Rfl:     clindamycin (CLEOCIN T) 1 % lotion, APPLY TO AFFECTED AREA TWICE A DAY, Disp: , Rfl:     cyclobenzaprine (FLEXERIL) 10 mg tablet, Take 10 mg by mouth as needed for muscle spasms, Disp: , Rfl:     gabapentin (NEURONTIN) 300 mg capsule, Take 1 capsule (300 mg total) by mouth 3 (three) times a day for 7 days, Disp: 21 capsule, Rfl: 0    HYDROcodone-acetaminophen (NORCO) 7 5-325 mg per tablet, Take 1 tablet by mouth as needed for pain, Disp: , Rfl:     ibuprofen (MOTRIN) 800 mg tablet, Take 1 tablet (800 mg total) by mouth every 8 (eight) hours for 21 days, Disp: 60 tablet, Rfl: 0    lisinopril (ZESTRIL) 10 mg tablet, Take 10 mg by mouth every morning , Disp: , Rfl:     metoprolol tartrate (LOPRESSOR) 50 mg tablet, Take 50 mg by mouth every morning , Disp: , Rfl:     Multiple Vitamin (multivitamin) capsule, Take by mouth, Disp: , Rfl:     naloxone (NARCAN) 4 mg/0 1 mL nasal spray, Administer 1 spray into a nostril  If no response after 2-3 minutes, give another dose in the other nostril using a new spray , Disp: 1 each, Rfl: 0    oxyCODONE (ROXICODONE) 5 mg immediate release tablet, Take 1 tablet (5 mg total) by mouth every 6 (six) hours as needed for severe pain for up to 25 dosesMax Daily Amount: 20 mg, Disp: 25 tablet, Rfl: 0    sertraline (ZOLOFT) 50 mg tablet, Take 50 mg by mouth every morning , Disp: , Rfl:     valACYclovir (VALTREX) 1,000 mg tablet, Take 1,000 mg by mouth as needed , Disp: , Rfl:   Allergies: Amoxicillin-pot clavulanate    Objective    Left breast has some dehiscence and scab  Resolving ecchymosis  Right breast is essentially healed  Both breasts are soft, without induration  She has sensation in left NAC, but right side feels num  Umbilicus has an open wound  Abdominal incision is closed  Abdomen is soft, slight tenderness  Assessment/Plan   Diagnoses and all orders for this visit:    H/O bilateral nipple sparing mastectomy    S/P b/l JAMES flaps    Woundcare discussed  Bacitracin to open wounds  OK to stop binder if desired    Silicone scar gel to scars if desired  OK to increase activity  No heavy lifting for 2 more weeks  Followup in 2 months      Siddharth Matthews PA-C

## 2021-04-13 DIAGNOSIS — Z23 ENCOUNTER FOR IMMUNIZATION: ICD-10-CM

## 2021-04-13 DIAGNOSIS — Z15.09 BRCA2 GENE MUTATION POSITIVE: ICD-10-CM

## 2021-04-13 DIAGNOSIS — Z15.01 BRCA2 GENE MUTATION POSITIVE: ICD-10-CM

## 2021-04-19 ENCOUNTER — OFFICE VISIT (OUTPATIENT)
Dept: PLASTIC SURGERY | Facility: CLINIC | Age: 51
End: 2021-04-19

## 2021-04-19 DIAGNOSIS — Z98.890 POST-OPERATIVE STATE: Primary | ICD-10-CM

## 2021-04-19 PROCEDURE — 99024 POSTOP FOLLOW-UP VISIT: CPT

## 2021-04-19 NOTE — PROGRESS NOTES
Norman Frye was seen in the office today for the problem with one of the wounds under her left breast  Patient reported that small area dehisced  Wound opening 2 1 cm  No signs of infection  The picture was taken  The area was gently cleaned  Bacitracin and xeroform were applied, followed by gauze  Patient was given the instructions and supplies to ease the DAILY  dressing changes  [place] is to call us immediately if she has fever or other signs of infection  Patient is to return to our office next week for another follow up

## 2021-05-15 DIAGNOSIS — Z15.01 BRCA2 GENE MUTATION POSITIVE: ICD-10-CM

## 2021-05-15 DIAGNOSIS — Z15.09 BRCA2 GENE MUTATION POSITIVE: ICD-10-CM

## 2021-05-30 DIAGNOSIS — Z15.01 BRCA2 GENE MUTATION POSITIVE: ICD-10-CM

## 2021-05-30 DIAGNOSIS — Z15.09 BRCA2 GENE MUTATION POSITIVE: ICD-10-CM

## 2021-06-14 ENCOUNTER — OFFICE VISIT (OUTPATIENT)
Dept: PLASTIC SURGERY | Facility: CLINIC | Age: 51
End: 2021-06-14

## 2021-06-14 VITALS — HEIGHT: 64 IN | TEMPERATURE: 97.2 F | WEIGHT: 187 LBS | BODY MASS INDEX: 31.92 KG/M2

## 2021-06-14 DIAGNOSIS — Z98.890 S/P FLAP GRAFT: Primary | ICD-10-CM

## 2021-06-14 PROCEDURE — 99024 POSTOP FOLLOW-UP VISIT: CPT | Performed by: PLASTIC SURGERY

## 2021-06-14 NOTE — PROGRESS NOTES
Betty Morris is 3 months post-op:   BREAST NIPPLE SPARING MATECTOMY (Bilateral)  BREAST IMMEDIATE RECONSTRUCTION WITH FLAP FREE DEEP INFERIOR EPIGASTRIC  (JAMES);  EXCISION RIB (Bilateral)  IMPLANTATION OF MESH TO RIGHT HEMIABDOMEN  PREVENA PLACEMENT (N/A)  Presenting for routine follow-up  S:  Doing well  Patient has no concerns at this time and very happy with results  O:   Bilateral breast are soft, NAC are viable, mild asymmetry, skin paddle with good capillary refill   Abdomen: soft, ND, well healed surgical scar, no keloids    A:  Satisfactory course  Patient doing well overall  We discussed above fail that she is doing very well and she is very pleased with the aesthetics  This point with discussed the her second-stage with the removal of the monitoring skin paddle for better asymmetry and cosmesis  P:  Surgery will be is bilateral breast flap revision  Risks and benefits of the procedure were explained in detail to the patient before signing informed consent

## 2021-07-10 NOTE — H&P
Assessment/Plan   Diagnoses and all orders for this visit:    BRCA2 gene mutation positive  -     acetaminophen (TYLENOL) 500 mg tablet; Take 2 tablets (1,000 mg total) by mouth 3 (three) times a day  -     ibuprofen (MOTRIN) 800 mg tablet; Take 1 tablet (800 mg total) by mouth every 8 (eight) hours for 21 days  -     gabapentin (NEURONTIN) 300 mg capsule; Take 1 capsule (300 mg total) by mouth 3 (three) times a day for 7 days  -     oxyCODONE (ROXICODONE) 5 mg immediate release tablet; Take 1 tablet (5 mg total) by mouth every 6 (six) hours as needed for severe pain for up to 25 dosesMax Daily Amount: 20 mg  -     aspirin (ECOTRIN) 325 mg EC tablet; Take 1 tablet (325 mg total) by mouth daily  -     naloxone (NARCAN) 4 mg/0 1 mL nasal spray; Administer 1 spray into a nostril  If no response after 2-3 minutes, give another dose in the other nostril using a new spray  During today's 45 minute visit, all of which was dedicated to counseling, I reviewed the anticipated preoperative, intraoperative, and postoperative courses  I informed her that the nurses will contact the patient the day prior to surgery in order to discuss orientation and logistical information  I will then see her the day of surgery where I will answer any last minute questions and perform my preoperative markings  We will then proceed to the operating room for an anticipated 10 hour procedure under general anesthesia, specifically a  Bilateral Nipple Sparing Mastectomy (IMF Incision), Bilateral Immediate Breast Reconstruction with Free Abdominal Tissue Transfer  For each procedure, I reviewed the incisions/scars, duration, recovery time, postoperative restrictions, and follow-up  When applicable, I discussed hospitalization, dressing changes, drains, and donor site morbidity        All risks, benefits, and options, including but not limited to, pain, scarring, bleeding, infection, asymmetry, contour deformity, damage to adjacent nerves, vessels, and organs, hematoma, seroma, paresthesias, anesthesia (temporary versus permanent), skin necrosis, fat necrosis, flap necrosis, wound dehiscence with need for healing by secondary intention and postoperative dressing changes, hypertrophic and/or keloid scar formation, deep venous thrombosis, pulmonary embolism, recurrence, and need for revision and/or reoperation were fully explained to the patient  All questions were answered  Once full understanding of the anticipated procedure was verified, informed consent was obtained  All risks, benefits, and options, including but not limited to, change or loss in sensation of the nipple and surrounding nipple/areola complex that may be transient or permanent, scars that will be visible on cut surfaces of the breast, wound separation, infection, breast asymmetry, increased or decreased pigmentation of the skin and/or nipple-areola complex, blood loss, hematoma, seroma, and fat necrosis  The patient will have an expected six-week postoperative recovery period during which time she will have activity restrictions  Specifically, this includes, but is not limited to, avoidance of heavy lifting (nothing heavier than a gallon of milk), avoidance of strenuous activity, avoidance of any activity which makes her hurt or perspire, avoidance of anything that places tension across the incisions, avoidance of submerging the incisions or operative area in water (including bathing in a bathtub, swimming, etc ), and avoidance of dirty, roseann, or contaminated environments  A surgical bra and abdominal binder would be recommended to be worn 24/7 for 6 weeks, except when bathing or when washing  It was emphasized to the patient that postoperatively, it is mandated to employ a high-protein, low salt diet, take deep breaths in order to avoid atelectasis, and ambulate at least 5 times a day in order to avoid deep venous thrombosis and pulmonary embolism      The patient was provided prescriptions for acetaminophen, ibuprofen, gabapentin, oxycodone and Hibiclens  The purposes of each were explained and the patient will have these filled prior to the day of the operation  At the conclusion of our conversation, the patient wished to proceed with surgery  Laith Rojo MD   [facility]   [address]   [address]   [address] JACKIE Tanael[address]   Office: [phone]      Subjective   Patient ID: Duy Hinson is a 48 y o  female  Duy Hinson is being seen today for preoperative evaluation  Since last visit, there has not been any changes in the patient's overall health status and/or surgical plan to report  Patient has a history of BRCA 2  They present preoperatively for planned: Bilateral Nipple Sparing Mastectomy (IMF Incision), Bilateral Immediate Breast Reconstruction with Free Abdominal Tissue Transfer  Scheduled for: 3/15/2021    In conjunction with: Dr Caballero Necessary     The following history of N/V post operative: none    Nicotine status: none     Vitals:    03/11/21 1532   BP: 138/84   Pulse: 55   Temp: (!) 96 8 °F (36 °C)     HPI    The following portions of the patient's history were reviewed and updated as appropriate: allergies, current medications, past family history, past medical history, past social history, past surgical history and problem list     Review of Systems   Constitutional: Negative  HENT: Negative  Eyes: Negative  Respiratory: Negative  Cardiovascular: Negative  Gastrointestinal: Negative  Endocrine: Negative  Genitourinary: Negative  Musculoskeletal: Negative  Skin: Negative  Allergic/Immunologic: Negative  Neurological: Negative  Hematological: Negative  Psychiatric/Behavioral: Negative  Objective   Physical Exam   Vitals reviewed  Constitutional  She appears well-developed and well-nourished  Eyes  Pupils are equal, round, and reactive to light  Cardiovascular: Normal rate  Pulmonary/Chest  Effort normal      Psychiatric  She has a normal mood and affect  Her behavior is normal      Abdomen and Hips  Soft  Hernia confirmed negative in the ventral area  Abdominal shape is panniculus  She has abdominal striae  She has vertical, abdominal skin redundancy  Patient has excess abdominal fat  Excess fat located in the: lower abdomen and jeffrey-umbilical      Breast  Breast Measurements:   Right Left   A: Sternal notch to nipple distance (cm) 26 25 5   B: Midsternum to nipple distance (cm)     C: Midclavicle to nipple distance (cm)     D: Nipple to inframammary fold (cm) 10 11   E: Base width (cm) 17 17   F: Inframammary distance (cm) 25 5      Right Left   Areolar diameter (cm) 5 5   Nipple diameter (cm)     Superior tissue pinch test (cm)     Breast ptosis (grade 0-3) 1 1         Her breasts have normal envelope compliance  Her breasts have adequate tissue coverage  Additional breast conditions include the following:   Right Breast: She is negative for a right mass  Left Breast: She is negative for a left mass  Body mass index is 33 36 kg/m²  10-Jul-2021 19:33

## 2021-08-02 ENCOUNTER — APPOINTMENT (OUTPATIENT)
Dept: LAB | Facility: CLINIC | Age: 51
End: 2021-08-02
Payer: COMMERCIAL

## 2021-08-02 DIAGNOSIS — Z85.3 PERSONAL HISTORY OF MALIGNANT NEOPLASM OF BREAST: ICD-10-CM

## 2021-08-02 LAB
ANION GAP SERPL CALCULATED.3IONS-SCNC: 5 MMOL/L (ref 4–13)
BASOPHILS # BLD AUTO: 0.07 THOUSANDS/ΜL (ref 0–0.1)
BASOPHILS NFR BLD AUTO: 1 % (ref 0–1)
BUN SERPL-MCNC: 14 MG/DL (ref 5–25)
CALCIUM SERPL-MCNC: 9.3 MG/DL (ref 8.3–10.1)
CHLORIDE SERPL-SCNC: 105 MMOL/L (ref 100–108)
CO2 SERPL-SCNC: 27 MMOL/L (ref 21–32)
CREAT SERPL-MCNC: 0.6 MG/DL (ref 0.6–1.3)
EOSINOPHIL # BLD AUTO: 0.2 THOUSAND/ΜL (ref 0–0.61)
EOSINOPHIL NFR BLD AUTO: 4 % (ref 0–6)
ERYTHROCYTE [DISTWIDTH] IN BLOOD BY AUTOMATED COUNT: 13.6 % (ref 11.6–15.1)
GFR SERPL CREATININE-BSD FRML MDRD: 106 ML/MIN/1.73SQ M
GLUCOSE P FAST SERPL-MCNC: 77 MG/DL (ref 65–99)
HCT VFR BLD AUTO: 44.3 % (ref 34.8–46.1)
HGB BLD-MCNC: 14.5 G/DL (ref 11.5–15.4)
IMM GRANULOCYTES # BLD AUTO: 0.01 THOUSAND/UL (ref 0–0.2)
IMM GRANULOCYTES NFR BLD AUTO: 0 % (ref 0–2)
LYMPHOCYTES # BLD AUTO: 1.02 THOUSANDS/ΜL (ref 0.6–4.47)
LYMPHOCYTES NFR BLD AUTO: 19 % (ref 14–44)
MCH RBC QN AUTO: 30.8 PG (ref 26.8–34.3)
MCHC RBC AUTO-ENTMCNC: 32.7 G/DL (ref 31.4–37.4)
MCV RBC AUTO: 94 FL (ref 82–98)
MONOCYTES # BLD AUTO: 0.46 THOUSAND/ΜL (ref 0.17–1.22)
MONOCYTES NFR BLD AUTO: 8 % (ref 4–12)
NEUTROPHILS # BLD AUTO: 3.7 THOUSANDS/ΜL (ref 1.85–7.62)
NEUTS SEG NFR BLD AUTO: 68 % (ref 43–75)
NRBC BLD AUTO-RTO: 0 /100 WBCS
PLATELET # BLD AUTO: 312 THOUSANDS/UL (ref 149–390)
PMV BLD AUTO: 10.1 FL (ref 8.9–12.7)
POTASSIUM SERPL-SCNC: 3.4 MMOL/L (ref 3.5–5.3)
RBC # BLD AUTO: 4.71 MILLION/UL (ref 3.81–5.12)
SODIUM SERPL-SCNC: 137 MMOL/L (ref 136–145)
WBC # BLD AUTO: 5.46 THOUSAND/UL (ref 4.31–10.16)

## 2021-08-02 PROCEDURE — 36415 COLL VENOUS BLD VENIPUNCTURE: CPT

## 2021-08-02 PROCEDURE — 80048 BASIC METABOLIC PNL TOTAL CA: CPT

## 2021-08-02 PROCEDURE — 85025 COMPLETE CBC W/AUTO DIFF WBC: CPT

## 2021-08-06 NOTE — PRE-PROCEDURE INSTRUCTIONS
Pre-Surgery Instructions:   Medication Instructions    acetaminophen (TYLENOL) 500 mg tablet Instructed to take as needed including DOS with sips water    ALPRAZolam (XANAX) 0 25 mg tablet Instructed to take as needed including DOS with sips water    aspirin (ECOTRIN) 325 mg EC tablet Instructed to stop X1 week prior    CALCIUM PO Instructed patient per Anesthesia Guidelines   cetirizine (ZyrTEC) 10 mg tablet Instructed to take per normal schedule including DOS with sips water    clindamycin (CLEOCIN T) 1 % lotion Instructed to take per normal schedule except DOS    cyclobenzaprine (FLEXERIL) 10 mg tablet Instructed to take as needed including DOS with sips water    HYDROcodone-acetaminophen (NORCO) 7 5-325 mg per tablet Instructed to take as needed including DOS with sips water    lisinopril (ZESTRIL) 10 mg tablet Instructed to take per normal schedule except DOS    metoprolol tartrate (LOPRESSOR) 50 mg tablet Instructed to take per normal schedule including DOS with sips water    Multiple Vitamin (multivitamin) capsule Instructed patient per Anesthesia Guidelines   naloxone (NARCAN) 4 mg/0 1 mL nasal spray Instructed to take as needed including DOS    sertraline (ZOLOFT) 50 mg tablet Instructed to take per normal schedule including DOS with sips water    valACYclovir (VALTREX) 1,000 mg tablet Instructed to take as needed including DOS with sips water    Pre op instructions per My Surgical Experience booklet,medications per anesthesia guidelines and showering instructions per Bonnie Palmer protocol reviewed-Patient has CHG  Instructed to avoid all ASA/NSAIDs and OTC Vit/Supp from now until after surgery  Tylenol ok prn  Pt  Verbalized an understanding of all instructions reviewed and offers no concerns at this time

## 2021-08-08 PROCEDURE — NC001 PR NO CHARGE: Performed by: PHYSICIAN ASSISTANT

## 2021-08-08 NOTE — H&P
H&P - Plastic Surgery   Snaaz Murry 46 y o  female MRN: [id number]  Unit/Bed#:  Encounter: [id number]           Assessment:  Personal history of malignant neoplasm of breast [Z85 3]  Plan:  BREAST RECONSTRUCTION MOUND REVISION (Bilateral Breast)        HPI:   Sanaz Murry is a 46y o  year old female who is post-op:   BREAST NIPPLE SPARING MATECTOMY (Bilateral)  BREAST IMMEDIATE RECONSTRUCTION WITH FLAP FREE DEEP INFERIOR EPIGASTRIC  (JAMES);  EXCISION RIB (Bilateral)  IMPLANTATION OF MESH TO RIGHT HEMIABDOMEN  PREVENA PLACEMENT (N/A)  She met with Dr Diana Segura in June to discuss  her second-stage, which entails the removal of the monitoring skin paddle for better symmetry and cosmesis  REVIEW OF SYSTEMS    GENERAL/CONSTITUTIONAL: The patient denies fever, fatigue, weakness, weight gain or weight loss  HEAD, EYES, EARS, NOSE AND THROAT: Eyes - The patient denies pain, redness, loss of vision, double or blurred vision and denies wearing glasses  The patient denies ringing in the ears, nosebleeds sinusitis, post nasal drip  Also denies frequent sore throats, hoarseness, painful swallowing  CARDIOVASCULAR: The patient denies chest pain, irregular heartbeats, palpitations, shortness of breath, heart murmurs, high blood pressure, cramps in his legs with walking, pain in his feet or toes at night or varicose veins  RESPIRATORY: The patient denies chronic cough, wheezing or night sweats  GASTROINTESTINAL: The patient denies decreased appetite, nausea, vomiting, diarrhea, constipation, blood in the stools  GENITOURINARY: The patient denies difficult urination, pain or burning with urination, blood in the urine  MUSCULOSKELETAL: The patient denies arm, thigh or calf cramps  No joint or muscle pain  No muscle weakness or tenderness  No joint swelling, neck pain, back pain or major orthopedic injuries  SKIN AND BREASTS: see hpi The patient denies easy bruising, skin redness, skin rash, hives    NEUROLOGIC: The patient denies headache, dizziness, fainting, memory loss  PSYCHIATRIC: The patient denies depression anxiety  ENDOCRINE: The patient denies intolerance to hot or cold temperature, flushing, fingernail changes, increased thirst, increased salt intake or decreased sexual desire  HEMATOLOGIC/LYMPHATIC: The patient denies anemia, bleeding tendency or clotting tendency  ALLERGIC/IMMUNOLOGIC: The patient denies rhinitis, asthma, skin sensitivity, latex allergies or sensitivity        Historical Information   Past Medical History:   Diagnosis Date    Anxiety     Hypertension     Migraines      Past Surgical History:   Procedure Laterality Date    CARPAL TUNNEL RELEASE Bilateral 12/2017    HYSTERECTOMY      LAPAROSCOPIC TOTAL HYSTERECTOMY      OOPHORECTOMY Bilateral 02/27/2015    VT BREAST RECONSTRUCTION W/FREE FLAP Bilateral 3/15/2021    Procedure: BREAST IMMEDIATE RECONSTRUCTION WITH FLAP FREE DEEP INFERIOR EPIGASTRIC  (JAMES); EXCISION RIB;  Surgeon: Ori Hwang MD;  [facility];  Service: Plastics    VT MASTECTOMY, SIMPLE, COMPLETE Bilateral 3/15/2021    Procedure: BREAST NIPPLE Roise Donaldo;  Surgeon: Digna Benjamin MD;  [facility];  Service: Surgical Oncology    VAC DRESSING APPLICATION N/A 8/90/4909    Procedure: Afshan Pickering;  Surgeon: Ori Hwang MD;  [facility];  Service: Plastics     Social History   Social History     Substance and Sexual Activity   Alcohol Use Yes    Comment: Socially     Social History     Substance and Sexual Activity   Drug Use Never     Social History     Tobacco Use   Smoking Status Never Smoker   Smokeless Tobacco Never Used     Family History:   Family History   Problem Relation Age of Onset    Ovarian cancer Mother 76    BRCA2 Positive Mother     Breast cancer Sister 40    BRCA2 Positive Sister     BRCA2 Positive Daughter     Breast cancer Maternal Grandmother 45    BRCA2 Positive Sister Meds/Allergies   No current facility-administered medications for this encounter  Current Outpatient Medications:     acetaminophen (TYLENOL) 500 mg tablet, Take 2 tablets (1,000 mg total) by mouth 3 (three) times a day, Disp: 60 tablet, Rfl: 0    ALPRAZolam (XANAX) 0 25 mg tablet, Take 0 25 mg by mouth as needed for anxiety, Disp: , Rfl:     aspirin (ECOTRIN) 325 mg EC tablet, TAKE 1 TABLET BY MOUTH EVERY DAY, Disp: 90 tablet, Rfl: 1    CALCIUM PO, Take 600 mg by mouth every morning , Disp: , Rfl:     cetirizine (ZyrTEC) 10 mg tablet, Take 10 mg by mouth as needed , Disp: , Rfl:     clindamycin (CLEOCIN T) 1 % lotion, as needed , Disp: , Rfl:     cyclobenzaprine (FLEXERIL) 10 mg tablet, Take 10 mg by mouth as needed for muscle spasms, Disp: , Rfl:     HYDROcodone-acetaminophen (NORCO) 7 5-325 mg per tablet, Take 1 tablet by mouth as needed for pain, Disp: , Rfl:     lisinopril (ZESTRIL) 10 mg tablet, Take 10 mg by mouth every morning , Disp: , Rfl:     metoprolol tartrate (LOPRESSOR) 50 mg tablet, Take 50 mg by mouth every morning , Disp: , Rfl:     Multiple Vitamin (multivitamin) capsule, Take by mouth, Disp: , Rfl:     naloxone (NARCAN) 4 mg/0 1 mL nasal spray, Administer 1 spray into a nostril  If no response after 2-3 minutes, give another dose in the other nostril using a new spray , Disp: 1 each, Rfl: 0    sertraline (ZOLOFT) 50 mg tablet, Take 50 mg by mouth every morning , Disp: , Rfl:     valACYclovir (VALTREX) 1,000 mg tablet, Take 1,000 mg by mouth as needed , Disp: , Rfl:       Objective     General appearance: alert and oriented, in no acute distress  Head: Normocephalic, without obvious abnormality, atraumatic  Neck: no JVD, supple, symmetrical, trachea midline and thyroid not enlarged, symmetric, no tenderness/mass/nodules  Back: negative  Lungs: negative  Breasts: JAMES scars   Asymmetry  Heart: regular rate and rhythm  Abdomen: normal findings: soft, non-tender  Extremities: extremities normal, warm and well-perfused; no cyanosis, clubbing, or edema  Skin: Skin color, texture, turgor normal  No rashes or lesions  Neurologic: Grossly normal    Lab Results:   Lab Results   Component Value Date    WBC 5 46 08/02/2021    HGB 14 5 08/02/2021    HCT 44 3 08/02/2021    MCV 94 08/02/2021     08/02/2021          No results found for: TISSUECULT, WOUNDCULT      Imaging Studies:   No results found  EKG, Pathology, and Other Studies:   Lab Results   Component Value Date/Time    [facility]  03/15/2021 09:39 AM     A  Breast, Right, Mastectomy:  - Benign breast tissue with atypical lobular hyperplasia (ALH) and fibrocystic changes  B  Breast, Left, Mastectomy:  - Benign breast tissue with atypical lobular hyperplasia (ALH) and fibrocystic changes           No intake or output data in the 24 hours ending 08/08/21 1036    Invasive Devices     Drain            Closed/Suction Drain Left;Lateral Abdomen Bulb 15 Fr  145 days    Closed/Suction Drain Left;Lateral Chest Bulb 15 Fr  145 days    Closed/Suction Drain Right;Lateral Abdomen Bulb 15 Fr  145 days    Closed/Suction Drain Right;Lateral Chest Bulb 15 Fr  145 days                VTE Prophylaxis: Sequential compression device Anastasia Jack     Code Status: Prior  Advance Directive and Living Will:      Power of :

## 2021-08-09 ENCOUNTER — ANESTHESIA EVENT (OUTPATIENT)
Dept: PERIOP | Facility: HOSPITAL | Age: 51
End: 2021-08-09
Payer: COMMERCIAL

## 2021-08-09 NOTE — ANESTHESIA PREPROCEDURE EVALUATION
Medical History  History Comments   Hypertension    Migraines    Anxiety      Procedure:  BREAST RECONSTRUCTION MOUND REVISION (Bilateral Breast)    Relevant Problems   ANESTHESIA (within normal limits)      CARDIO   (+) HTN (hypertension)      NEURO/PSYCH   (+) Anxiety   (+) Headache        Physical Exam    Airway    Mallampati score: II  TM Distance: >3 FB  Neck ROM: full     Dental   No notable dental hx     Cardiovascular  Rate: normal,     Pulmonary  Pulmonary exam normal     Other Findings        Anesthesia Plan  ASA Score- 2     Anesthesia Type- general with ASA Monitors  Additional Monitors:   Airway Plan: ETT  Plan Factors-Exercise tolerance (METS): >4 METS  Chart reviewed  Patient summary reviewed  Patient is not a current smoker  Induction- intravenous  Postoperative Plan- Plan for postoperative opioid use  Informed Consent- Anesthetic plan and risks discussed with patient  I personally reviewed this patient with the CRNA  Discussed and agreed on the Anesthesia Plan with the CRNA  Asha Torres

## 2021-08-10 ENCOUNTER — HOSPITAL ENCOUNTER (OUTPATIENT)
Facility: HOSPITAL | Age: 51
Setting detail: OUTPATIENT SURGERY
Discharge: HOME | End: 2021-08-10
Attending: PLASTIC SURGERY | Admitting: PLASTIC SURGERY
Payer: COMMERCIAL

## 2021-08-10 ENCOUNTER — ANESTHESIA (OUTPATIENT)
Dept: PERIOP | Facility: HOSPITAL | Age: 51
End: 2021-08-10
Payer: COMMERCIAL

## 2021-08-10 VITALS
HEART RATE: 78 BPM | OXYGEN SATURATION: 96 % | WEIGHT: 187 LBS | RESPIRATION RATE: 20 BRPM | BODY MASS INDEX: 31.92 KG/M2 | TEMPERATURE: 97.3 F | DIASTOLIC BLOOD PRESSURE: 68 MMHG | HEIGHT: 64 IN | SYSTOLIC BLOOD PRESSURE: 140 MMHG

## 2021-08-10 PROCEDURE — 19380 REVJ RECONSTRUCTED BREAST: CPT | Performed by: PLASTIC SURGERY

## 2021-08-10 RX ORDER — PROPOFOL 10 MG/ML
INJECTION, EMULSION INTRAVENOUS CONTINUOUS PRN
Status: DISCONTINUED | OUTPATIENT
Start: 2021-08-10 | End: 2021-08-10

## 2021-08-10 RX ORDER — MIDAZOLAM HYDROCHLORIDE 2 MG/2ML
INJECTION, SOLUTION INTRAMUSCULAR; INTRAVENOUS AS NEEDED
Status: DISCONTINUED | OUTPATIENT
Start: 2021-08-10 | End: 2021-08-10

## 2021-08-10 RX ORDER — FENTANYL CITRATE 50 UG/ML
INJECTION, SOLUTION INTRAMUSCULAR; INTRAVENOUS AS NEEDED
Status: DISCONTINUED | OUTPATIENT
Start: 2021-08-10 | End: 2021-08-10

## 2021-08-10 RX ORDER — GLYCOPYRROLATE 0.2 MG/ML
INJECTION INTRAMUSCULAR; INTRAVENOUS AS NEEDED
Status: DISCONTINUED | OUTPATIENT
Start: 2021-08-10 | End: 2021-08-10

## 2021-08-10 RX ORDER — LIDOCAINE HYDROCHLORIDE AND EPINEPHRINE 10; 10 MG/ML; UG/ML
INJECTION, SOLUTION INFILTRATION; PERINEURAL AS NEEDED
Status: DISCONTINUED | OUTPATIENT
Start: 2021-08-10 | End: 2021-08-10 | Stop reason: HOSPADM

## 2021-08-10 RX ORDER — ONDANSETRON 2 MG/ML
4 INJECTION INTRAMUSCULAR; INTRAVENOUS ONCE AS NEEDED
Status: DISCONTINUED | OUTPATIENT
Start: 2021-08-10 | End: 2021-08-10 | Stop reason: HOSPADM

## 2021-08-10 RX ORDER — LIDOCAINE HYDROCHLORIDE 10 MG/ML
INJECTION, SOLUTION EPIDURAL; INFILTRATION; INTRACAUDAL; PERINEURAL AS NEEDED
Status: DISCONTINUED | OUTPATIENT
Start: 2021-08-10 | End: 2021-08-10

## 2021-08-10 RX ORDER — MAGNESIUM HYDROXIDE 1200 MG/15ML
LIQUID ORAL AS NEEDED
Status: DISCONTINUED | OUTPATIENT
Start: 2021-08-10 | End: 2021-08-10 | Stop reason: HOSPADM

## 2021-08-10 RX ORDER — OXYCODONE HYDROCHLORIDE 5 MG/1
5 TABLET ORAL EVERY 4 HOURS PRN
Status: DISCONTINUED | OUTPATIENT
Start: 2021-08-10 | End: 2021-08-10 | Stop reason: HOSPADM

## 2021-08-10 RX ORDER — SODIUM CHLORIDE, SODIUM LACTATE, POTASSIUM CHLORIDE, CALCIUM CHLORIDE 600; 310; 30; 20 MG/100ML; MG/100ML; MG/100ML; MG/100ML
75 INJECTION, SOLUTION INTRAVENOUS CONTINUOUS
Status: DISCONTINUED | OUTPATIENT
Start: 2021-08-10 | End: 2021-08-10 | Stop reason: HOSPADM

## 2021-08-10 RX ORDER — ACETAMINOPHEN 325 MG/1
975 TABLET ORAL ONCE
Status: COMPLETED | OUTPATIENT
Start: 2021-08-10 | End: 2021-08-10

## 2021-08-10 RX ORDER — PROPOFOL 10 MG/ML
INJECTION, EMULSION INTRAVENOUS AS NEEDED
Status: DISCONTINUED | OUTPATIENT
Start: 2021-08-10 | End: 2021-08-10

## 2021-08-10 RX ORDER — ROPIVACAINE HYDROCHLORIDE 5 MG/ML
INJECTION, SOLUTION EPIDURAL; INFILTRATION; PERINEURAL AS NEEDED
Status: DISCONTINUED | OUTPATIENT
Start: 2021-08-10 | End: 2021-08-10

## 2021-08-10 RX ORDER — DEXAMETHASONE SODIUM PHOSPHATE 4 MG/ML
INJECTION, SOLUTION INTRA-ARTICULAR; INTRALESIONAL; INTRAMUSCULAR; INTRAVENOUS; SOFT TISSUE AS NEEDED
Status: DISCONTINUED | OUTPATIENT
Start: 2021-08-10 | End: 2021-08-10

## 2021-08-10 RX ORDER — SODIUM CHLORIDE, SODIUM LACTATE, POTASSIUM CHLORIDE, CALCIUM CHLORIDE 600; 310; 30; 20 MG/100ML; MG/100ML; MG/100ML; MG/100ML
50 INJECTION, SOLUTION INTRAVENOUS CONTINUOUS
Status: DISCONTINUED | OUTPATIENT
Start: 2021-08-10 | End: 2021-08-10 | Stop reason: HOSPADM

## 2021-08-10 RX ORDER — HYDROMORPHONE HCL IN WATER/PF 6 MG/30 ML
0.2 PATIENT CONTROLLED ANALGESIA SYRINGE INTRAVENOUS
Status: DISCONTINUED | OUTPATIENT
Start: 2021-08-10 | End: 2021-08-10 | Stop reason: HOSPADM

## 2021-08-10 RX ORDER — FENTANYL CITRATE/PF 50 MCG/ML
25 SYRINGE (ML) INJECTION
Status: DISCONTINUED | OUTPATIENT
Start: 2021-08-10 | End: 2021-08-10 | Stop reason: HOSPADM

## 2021-08-10 RX ORDER — SODIUM CHLORIDE, SODIUM LACTATE, POTASSIUM CHLORIDE, CALCIUM CHLORIDE 600; 310; 30; 20 MG/100ML; MG/100ML; MG/100ML; MG/100ML
INJECTION, SOLUTION INTRAVENOUS CONTINUOUS PRN
Status: DISCONTINUED | OUTPATIENT
Start: 2021-08-10 | End: 2021-08-10

## 2021-08-10 RX ORDER — ONDANSETRON 2 MG/ML
INJECTION INTRAMUSCULAR; INTRAVENOUS AS NEEDED
Status: DISCONTINUED | OUTPATIENT
Start: 2021-08-10 | End: 2021-08-10

## 2021-08-10 RX ORDER — CLINDAMYCIN PHOSPHATE 900 MG/50ML
900 INJECTION INTRAVENOUS ONCE
Status: COMPLETED | OUTPATIENT
Start: 2021-08-10 | End: 2021-08-10

## 2021-08-10 RX ORDER — GABAPENTIN 300 MG/1
300 CAPSULE ORAL ONCE
Status: COMPLETED | OUTPATIENT
Start: 2021-08-10 | End: 2021-08-10

## 2021-08-10 RX ADMIN — ONDANSETRON 4 MG: 2 INJECTION INTRAMUSCULAR; INTRAVENOUS at 15:24

## 2021-08-10 RX ADMIN — PROPOFOL 120 MCG/KG/MIN: 10 INJECTION, EMULSION INTRAVENOUS at 15:16

## 2021-08-10 RX ADMIN — SODIUM CHLORIDE, SODIUM LACTATE, POTASSIUM CHLORIDE, AND CALCIUM CHLORIDE: .6; .31; .03; .02 INJECTION, SOLUTION INTRAVENOUS at 15:05

## 2021-08-10 RX ADMIN — FENTANYL CITRATE 50 MCG: 50 INJECTION, SOLUTION INTRAMUSCULAR; INTRAVENOUS at 16:18

## 2021-08-10 RX ADMIN — FENTANYL CITRATE 25 MCG: 50 INJECTION INTRAMUSCULAR; INTRAVENOUS at 17:17

## 2021-08-10 RX ADMIN — GLYCOPYRROLATE 0.1 MG: 0.2 INJECTION, SOLUTION INTRAMUSCULAR; INTRAVENOUS at 15:07

## 2021-08-10 RX ADMIN — CLINDAMYCIN PHOSPHATE 900 MG: 18 INJECTION, SOLUTION INTRAMUSCULAR; INTRAVENOUS at 15:18

## 2021-08-10 RX ADMIN — SODIUM CHLORIDE, SODIUM LACTATE, POTASSIUM CHLORIDE, AND CALCIUM CHLORIDE: .6; .31; .03; .02 INJECTION, SOLUTION INTRAVENOUS at 16:18

## 2021-08-10 RX ADMIN — GABAPENTIN 300 MG: 300 CAPSULE ORAL at 11:46

## 2021-08-10 RX ADMIN — PROPOFOL 200 MG: 10 INJECTION, EMULSION INTRAVENOUS at 15:15

## 2021-08-10 RX ADMIN — FENTANYL CITRATE 50 MCG: 50 INJECTION, SOLUTION INTRAMUSCULAR; INTRAVENOUS at 15:23

## 2021-08-10 RX ADMIN — OXYCODONE HYDROCHLORIDE 5 MG: 5 TABLET ORAL at 18:43

## 2021-08-10 RX ADMIN — DEXAMETHASONE SODIUM PHOSPHATE 4 MG: 4 INJECTION INTRA-ARTICULAR; INTRALESIONAL; INTRAMUSCULAR; INTRAVENOUS; SOFT TISSUE at 15:24

## 2021-08-10 RX ADMIN — MIDAZOLAM HYDROCHLORIDE 2 MG: 1 INJECTION, SOLUTION INTRAMUSCULAR; INTRAVENOUS at 15:05

## 2021-08-10 RX ADMIN — ACETAMINOPHEN 975 MG: 325 TABLET, FILM COATED ORAL at 11:45

## 2021-08-10 RX ADMIN — LIDOCAINE HYDROCHLORIDE 50 MG: 10 INJECTION, SOLUTION EPIDURAL; INFILTRATION; INTRACAUDAL at 15:15

## 2021-08-10 NOTE — INTERVAL H&P NOTE
H&P reviewed  After examining the patient I find no changes in the patients condition since the H&P had been written      Vitals:    08/10/21 1132   BP: (!) 174/107   Pulse: 66   Resp: 20   Temp: (!) 96 7 °F (35 9 °C)   SpO2: 98%

## 2021-08-10 NOTE — OP NOTE
OPERATIVE REPORT  PATIENT NAME: Camryn Landa    :  1970  MRN: [id number]  Pt Location: [facility]    SURGERY DATE: 8/10/2021    Surgeon(s) and Role:     * Carmtia Souza MD - Primary     * Hanny Patel PA-C - Assisting    Preop Diagnosis:  Personal history of malignant neoplasm of breast [Z85 3]    Post-Op Diagnosis Codes:     * Personal history of malignant neoplasm of breast [Z85 3]    Procedure(s) (LRB):  BREAST RECONSTRUCTION MOUND REVISION (Bilateral)    Specimen(s):  * No specimens in log *    Estimated Blood Loss:   Minimal    Drains:  Closed/Suction Drain Left;Lateral Chest Bulb 15 Fr  (Active)   Number of days: 148       Closed/Suction Drain Right;Lateral Chest Bulb 15 Fr  (Active)   Number of days: 148       Closed/Suction Drain Left;Lateral Abdomen Bulb 15 Fr  (Active)   Number of days: 148       Closed/Suction Drain Right;Lateral Abdomen Bulb 15 Fr  (Active)   Number of days: 148       Anesthesia Type:   Choice    Operative Indications:  Personal history of malignant neoplasm of breast [Z85 3]      Operative Findings:  Bilateral skin paddles were removed and primary closure along IMF     Complications:   None    Procedure and Technique:  Patient was met in the preoperative holding area and all last minute questions were properly answered and addressed  Preoperative markings were then made she was then taken to the operative theater and placed in supine position  After smooth anesthesia was then induced and all the necessary perioperative measures were taken the chest was then prepped and draped in usual sterile fashion and a time-out was then performed  Procedure was performed the same fashion bilaterally unless otherwise noticed it  An elliptical excision on the previous skin paddle with extension along the IMF was then marked and infiltrated with 10 mL of 1% lidocaine with epinephrine    Both the skin paddle was then incised with a scalpel and with electrocautery the skin and dermis was then removed and passed of the table and discarded  This followed for more softer lower pole  Skin flap was then raised cephalad in order to allow closure without undue tension and just to the level of the nipple-areolar complex to avoid decreased perfusion in the area  Hemostasis and irrigation was then performed  Tailor tacking of the skin and closure was then performed with deep dermal 3-0 Monocryl and a running 3-0 strata fix subcuticular layer  The right nipple dimpling was removed excised and full-thickness and closed in layers 3-0 Monocryl and 4-0 Monocryl in the same fashion  Surgical glue, Steri-Strips and dry dressings applied the conclusion of the case       I was present for all critical portions of the procedure    Patient Disposition:  PACU  and hemodynamically stable    SIGNATURE: Sung Gardiner MD  DATE: August 10, 2021  TIME: 3:56 PM

## 2021-08-10 NOTE — ANESTHESIA POSTPROCEDURE EVALUATION
Post-Op Assessment Note    CV Status:  Stable    Pain management: adequate     Mental Status:  Lethargic and sleepy   Hydration Status:  Stable   PONV Controlled:  None   Airway Patency:  Patent      Post Op Vitals Reviewed: Yes      Staff: CRNA         No complications documented      BP     Temp     Pulse     Resp      SpO2

## 2021-08-10 NOTE — DISCHARGE INSTRUCTIONS
[facility] Plastic and Reconstructive Surgery  [address], [address]  Phone: [phone]     Postoperative Instructions for Outpatient Surgery     These instructions are being provided by your doctor to give you basic guidelines during your post-op recovery  Please let our office know if your contact information has changed  Please call the office today for an appointment in one week for your first postoperative care visit  Please call my office at any time if you have drainage from incisions, increased redness or swelling, or a fever greater than 100 5  Dressings: Keep incision clean, dry, intact  Activity Restrictions: No strenuous activities  Bathing: You may shower in 48 hours  Pat incision dry  Please do not rub incision  Medications:    Resume pre-op medications     You may take tylenol, aleve, or ibuprofen for pain control

## 2021-08-16 ENCOUNTER — OFFICE VISIT (OUTPATIENT)
Dept: PLASTIC SURGERY | Facility: CLINIC | Age: 51
End: 2021-08-16

## 2021-08-16 DIAGNOSIS — Z98.890 POST-OPERATIVE STATE: Primary | ICD-10-CM

## 2021-08-16 PROCEDURE — 99024 POSTOP FOLLOW-UP VISIT: CPT

## 2021-08-16 NOTE — PROGRESS NOTES
Carina Campbell was seen in the office today for the first post op visit following the breast reconstruction mound revision of  the bilateral breasts on 8/10  The right breast extremely bruised with erythema, painful to touch  The incisions clean, dry and intact  The steri strips were exchanged today  The pictures were obtained  Patient will return to our office for another check  All questions were answered to patient's satisfaction

## 2021-08-24 ENCOUNTER — APPOINTMENT (OUTPATIENT)
Dept: LAB | Facility: CLINIC | Age: 51
End: 2021-08-24
Payer: COMMERCIAL

## 2021-08-24 DIAGNOSIS — Z90.13 H/O BILATERAL MASTECTOMY: ICD-10-CM

## 2021-08-24 LAB
BASOPHILS # BLD AUTO: 0.09 THOUSANDS/ΜL (ref 0–0.1)
BASOPHILS NFR BLD AUTO: 1 % (ref 0–1)
EOSINOPHIL # BLD AUTO: 0.28 THOUSAND/ΜL (ref 0–0.61)
EOSINOPHIL NFR BLD AUTO: 4 % (ref 0–6)
ERYTHROCYTE [DISTWIDTH] IN BLOOD BY AUTOMATED COUNT: 13.7 % (ref 11.6–15.1)
HCT VFR BLD AUTO: 41.7 % (ref 34.8–46.1)
HGB BLD-MCNC: 14.1 G/DL (ref 11.5–15.4)
IMM GRANULOCYTES # BLD AUTO: 0.03 THOUSAND/UL (ref 0–0.2)
IMM GRANULOCYTES NFR BLD AUTO: 0 % (ref 0–2)
LYMPHOCYTES # BLD AUTO: 1.23 THOUSANDS/ΜL (ref 0.6–4.47)
LYMPHOCYTES NFR BLD AUTO: 18 % (ref 14–44)
MCH RBC QN AUTO: 31.2 PG (ref 26.8–34.3)
MCHC RBC AUTO-ENTMCNC: 33.8 G/DL (ref 31.4–37.4)
MCV RBC AUTO: 92 FL (ref 82–98)
MONOCYTES # BLD AUTO: 0.58 THOUSAND/ΜL (ref 0.17–1.22)
MONOCYTES NFR BLD AUTO: 8 % (ref 4–12)
NEUTROPHILS # BLD AUTO: 4.71 THOUSANDS/ΜL (ref 1.85–7.62)
NEUTS SEG NFR BLD AUTO: 69 % (ref 43–75)
NRBC BLD AUTO-RTO: 0 /100 WBCS
PLATELET # BLD AUTO: 356 THOUSANDS/UL (ref 149–390)
PMV BLD AUTO: 10.5 FL (ref 8.9–12.7)
RBC # BLD AUTO: 4.52 MILLION/UL (ref 3.81–5.12)
WBC # BLD AUTO: 6.92 THOUSAND/UL (ref 4.31–10.16)

## 2021-08-24 PROCEDURE — 36415 COLL VENOUS BLD VENIPUNCTURE: CPT

## 2021-08-24 PROCEDURE — 85025 COMPLETE CBC W/AUTO DIFF WBC: CPT

## 2021-08-26 ENCOUNTER — OFFICE VISIT (OUTPATIENT)
Dept: PLASTIC SURGERY | Facility: CLINIC | Age: 51
End: 2021-08-26

## 2021-08-26 DIAGNOSIS — Z90.13 H/O BILATERAL MASTECTOMY: Primary | ICD-10-CM

## 2021-08-26 PROCEDURE — 99024 POSTOP FOLLOW-UP VISIT: CPT | Performed by: PLASTIC SURGERY

## 2021-08-26 NOTE — PROGRESS NOTES
[place] is 2 weeks post-op: BREAST RECONSTRUCTION MOUND REVISION (Bilateral)   Presenting for routine follow-up  S:  Doing well  Patient has noted much improvement since last week hematoma evacuation of right breast  Drainage has decreased  O:  Right breast much softer, no active bleeding, minimal residual clot   Drainage as expected  Left breast: soft, suture line intact    A:  Satisfactory course  We discussed that she is doing much better and at this time will continue with local wound care and no further intervention at this time  Will re-evaluate in future for potential delayed primary closure if necessary  P: Sutures removed  Patient to return in 2 weeks  Patient is to return as needed for redness, swelling, discomfort, or any concern about her surgery

## 2021-08-27 VITALS — BODY MASS INDEX: 31.58 KG/M2 | HEIGHT: 64 IN | TEMPERATURE: 97.3 F | WEIGHT: 185 LBS

## 2021-09-06 ENCOUNTER — ANESTHESIA EVENT (OUTPATIENT)
Dept: PERIOP | Facility: HOSPITAL | Age: 51
End: 2021-09-06
Payer: COMMERCIAL

## 2021-09-06 PROCEDURE — NC001 PR NO CHARGE: Performed by: PHYSICIAN ASSISTANT

## 2021-09-06 NOTE — H&P
H&P - Plastic Surgery   Raz Patel 46 y o  female MRN: [id number]  Unit/Bed#:  Encounter: [id number]           Assessment:  HX: breast cancer [Z85 3]  Plan:  DEBRIDEMENT WOUND [facility] OUT) RIGHT BREAST; CLOSURE (Right Breast)        HPI:   Raz Patel is a 46y o  year old female who presents s/p JAMES flap breast reconstruction on 3/15/21, who underwent reconstruction and mound revision 8/10/21  She then developed a hematoma in her right breast   She will undergo debridement and washout of right breast and closure of her wound  REVIEW OF SYSTEMS    GENERAL/CONSTITUTIONAL: The patient denies fever, fatigue, weakness, weight gain or weight loss  HEAD, EYES, EARS, NOSE AND THROAT: Eyes - The patient denies pain, redness, loss of vision, double or blurred vision and denies wearing glasses  The patient denies ringing in the ears, nosebleeds sinusitis, post nasal drip  Also denies frequent sore throats, hoarseness, painful swallowing  CARDIOVASCULAR: The patient denies chest pain, irregular heartbeats, palpitations, shortness of breath, heart murmurs, high blood pressure, cramps in his legs with walking, pain in his feet or toes at night or varicose veins  RESPIRATORY: The patient denies chronic cough, wheezing or night sweats  GASTROINTESTINAL: The patient denies decreased appetite, nausea, vomiting, diarrhea, constipation, blood in the stools  GENITOURINARY: The patient denies difficult urination, pain or burning with urination, blood in the urine  MUSCULOSKELETAL: The patient denies arm, thigh or calf cramps  No joint or muscle pain  No muscle weakness or tenderness  No joint swelling, neck pain, back pain or major orthopedic injuries  SKIN AND BREASTS: see hpi The patient denies easy bruising, skin redness, skin rash, hives  NEUROLOGIC: The patient denies headache, dizziness, fainting, memory loss  PSYCHIATRIC: The patient denies depression anxiety    ENDOCRINE: The patient denies intolerance to hot or cold temperature, flushing, fingernail changes, increased thirst, increased salt intake or decreased sexual desire  HEMATOLOGIC/LYMPHATIC: The patient denies anemia, bleeding tendency or clotting tendency  ALLERGIC/IMMUNOLOGIC: The patient denies rhinitis, asthma, skin sensitivity, latex allergies or sensitivity        Historical Information   Past Medical History:   Diagnosis Date    Anxiety     Hypertension     Migraines      Past Surgical History:   Procedure Laterality Date    CARPAL TUNNEL RELEASE Bilateral 12/2017    HYSTERECTOMY      LAPAROSCOPIC TOTAL HYSTERECTOMY      OOPHORECTOMY Bilateral 02/27/2015    VA BREAST RECONSTRUCTION W/FREE FLAP Bilateral 3/15/2021    Procedure: BREAST IMMEDIATE RECONSTRUCTION WITH FLAP FREE DEEP INFERIOR EPIGASTRIC  (JAMES); EXCISION RIB;  Surgeon: Jessica Gomez MD;  [facility];  Service: Plastics    VA MASTECTOMY, SIMPLE, COMPLETE Bilateral 3/15/2021    Procedure: BREAST NIPPLE Cherylynn Dings;  Surgeon: Ashley Miller MD;  [facility];  Service: Surgical Oncology    VA REVISION OF RECONSTRUCTED BREAST Bilateral 8/10/2021    Procedure: BREAST RECONSTRUCTION MOUND REVISION;  Surgeon: Jessica Gomez MD;  [facility];  Service: Plastics    VAC DRESSING APPLICATION N/A 0/43/2189    Procedure: Hayes Lim;  Surgeon: Jessica Gomez MD;  [facility];  Service: Plastics     Social History   Social History     Substance and Sexual Activity   Alcohol Use Yes    Comment: Socially     Social History     Substance and Sexual Activity   Drug Use Never     Social History     Tobacco Use   Smoking Status Never Smoker   Smokeless Tobacco Never Used     Family History:   Family History   Problem Relation Age of Onset    Ovarian cancer Mother 76    BRCA2 Positive Mother     Breast cancer Sister 40    BRCA2 Positive Sister     BRCA2 Positive Daughter     Breast cancer Maternal Grandmother 45    BRCA2 Positive Sister        Meds/Allergies   No current facility-administered medications for this encounter  Current Outpatient Medications:     acetaminophen (TYLENOL) 500 mg tablet, Take 2 tablets (1,000 mg total) by mouth 3 (three) times a day, Disp: 60 tablet, Rfl: 0    ALPRAZolam (XANAX) 0 25 mg tablet, Take 0 25 mg by mouth as needed for anxiety, Disp: , Rfl:     aspirin (ECOTRIN) 325 mg EC tablet, TAKE 1 TABLET BY MOUTH EVERY DAY, Disp: 90 tablet, Rfl: 1    CALCIUM PO, Take 600 mg by mouth every morning , Disp: , Rfl:     cetirizine (ZyrTEC) 10 mg tablet, Take 10 mg by mouth as needed , Disp: , Rfl:     clindamycin (CLEOCIN T) 1 % lotion, as needed , Disp: , Rfl:     cyclobenzaprine (FLEXERIL) 10 mg tablet, Take 10 mg by mouth as needed for muscle spasms, Disp: , Rfl:     HYDROcodone-acetaminophen (NORCO) 7 5-325 mg per tablet, Take 1 tablet by mouth as needed for pain, Disp: , Rfl:     lisinopril (ZESTRIL) 10 mg tablet, Take 10 mg by mouth every morning , Disp: , Rfl:     metoprolol tartrate (LOPRESSOR) 50 mg tablet, Take 50 mg by mouth every morning , Disp: , Rfl:     Multiple Vitamin (multivitamin) capsule, Take by mouth, Disp: , Rfl:     naloxone (NARCAN) 4 mg/0 1 mL nasal spray, Administer 1 spray into a nostril  If no response after 2-3 minutes, give another dose in the other nostril using a new spray , Disp: 1 each, Rfl: 0    sertraline (ZOLOFT) 50 mg tablet, Take 50 mg by mouth every morning , Disp: , Rfl:     valACYclovir (VALTREX) 1,000 mg tablet, Take 1,000 mg by mouth as needed , Disp: , Rfl:       Objective     General appearance: alert and oriented, in no acute distress  Head: Normocephalic, without obvious abnormality, atraumatic  Lungs: clear to auscultation bilaterally  Breasts: right breast wound dehiscence  Congealed blood expressed    Heart: S1, S2 normal  Abdomen: soft, non-tender; bowel sounds normal; no masses,  no organomegaly  Extremities: extremities normal, warm and well-perfused; no cyanosis, clubbing, or edema  Neurologic: Grossly normal    Lab Results:   Lab Results   Component Value Date    WBC 6 92 08/24/2021    HGB 14 1 08/24/2021    HCT 41 7 08/24/2021    MCV 92 08/24/2021     08/24/2021          No results found for: TISSUECULT, WOUNDCULT      Imaging Studies:   No results found  EKG, Pathology, and Other Studies:   Lab Results   Component Value Date/Time    [facility]  03/15/2021 09:39 AM     A  Breast, Right, Mastectomy:  - Benign breast tissue with atypical lobular hyperplasia (ALH) and fibrocystic changes  B  Breast, Left, Mastectomy:  - Benign breast tissue with atypical lobular hyperplasia (ALH) and fibrocystic changes  No intake or output data in the 24 hours ending 09/06/21 1958    Invasive Devices     Drain            Closed/Suction Drain Left;Lateral Abdomen Bulb 15 Fr  175 days    Closed/Suction Drain Left;Lateral Chest Bulb 15 Fr  175 days    Closed/Suction Drain Right;Lateral Abdomen Bulb 15 Fr  175 days    Closed/Suction Drain Right;Lateral Chest Bulb 15 Fr   175 days                VTE Prophylaxis: Sequential compression device Mariya South)     Code Status: Prior  Advance Directive and Living Will:      Power of :

## 2021-09-07 ENCOUNTER — ANESTHESIA (OUTPATIENT)
Dept: PERIOP | Facility: HOSPITAL | Age: 51
End: 2021-09-07
Payer: COMMERCIAL

## 2021-09-07 ENCOUNTER — HOSPITAL ENCOUNTER (OUTPATIENT)
Facility: HOSPITAL | Age: 51
Setting detail: OUTPATIENT SURGERY
Discharge: HOME | End: 2021-09-07
Attending: PLASTIC SURGERY | Admitting: PLASTIC SURGERY
Payer: COMMERCIAL

## 2021-09-07 VITALS
TEMPERATURE: 97.8 F | HEIGHT: 64 IN | RESPIRATION RATE: 17 BRPM | BODY MASS INDEX: 31.58 KG/M2 | HEART RATE: 69 BPM | DIASTOLIC BLOOD PRESSURE: 78 MMHG | OXYGEN SATURATION: 97 % | SYSTOLIC BLOOD PRESSURE: 127 MMHG | WEIGHT: 184.97 LBS

## 2021-09-07 PROBLEM — C50.919 BREAST CANCER (HCC): Status: ACTIVE | Noted: 2021-09-07

## 2021-09-07 PROBLEM — C50.919 BREAST CANCER (HCC): Status: RESOLVED | Noted: 2021-09-07 | Resolved: 2021-09-07

## 2021-09-07 PROCEDURE — 13160 SEC CLSR SURG WND/DEHSN XTN: CPT | Performed by: PLASTIC SURGERY

## 2021-09-07 RX ORDER — SODIUM CHLORIDE, SODIUM LACTATE, POTASSIUM CHLORIDE, CALCIUM CHLORIDE 600; 310; 30; 20 MG/100ML; MG/100ML; MG/100ML; MG/100ML
125 INJECTION, SOLUTION INTRAVENOUS CONTINUOUS
Status: DISCONTINUED | OUTPATIENT
Start: 2021-09-07 | End: 2021-09-07

## 2021-09-07 RX ORDER — OXYCODONE HYDROCHLORIDE 5 MG/1
5 TABLET ORAL EVERY 4 HOURS PRN
Status: DISCONTINUED | OUTPATIENT
Start: 2021-09-07 | End: 2021-09-07 | Stop reason: HOSPADM

## 2021-09-07 RX ORDER — GABAPENTIN 300 MG/1
300 CAPSULE ORAL ONCE
Status: COMPLETED | OUTPATIENT
Start: 2021-09-07 | End: 2021-09-07

## 2021-09-07 RX ORDER — HYDROMORPHONE HCL/PF 1 MG/ML
0.2 SYRINGE (ML) INJECTION
Status: DISCONTINUED | OUTPATIENT
Start: 2021-09-07 | End: 2021-09-07 | Stop reason: HOSPADM

## 2021-09-07 RX ORDER — FENTANYL CITRATE 50 UG/ML
INJECTION, SOLUTION INTRAMUSCULAR; INTRAVENOUS AS NEEDED
Status: DISCONTINUED | OUTPATIENT
Start: 2021-09-07 | End: 2021-09-07

## 2021-09-07 RX ORDER — SODIUM CHLORIDE, SODIUM LACTATE, POTASSIUM CHLORIDE, CALCIUM CHLORIDE 600; 310; 30; 20 MG/100ML; MG/100ML; MG/100ML; MG/100ML
INJECTION, SOLUTION INTRAVENOUS CONTINUOUS PRN
Status: DISCONTINUED | OUTPATIENT
Start: 2021-09-07 | End: 2021-09-07

## 2021-09-07 RX ORDER — MIDAZOLAM HYDROCHLORIDE 2 MG/2ML
INJECTION, SOLUTION INTRAMUSCULAR; INTRAVENOUS AS NEEDED
Status: DISCONTINUED | OUTPATIENT
Start: 2021-09-07 | End: 2021-09-07

## 2021-09-07 RX ORDER — PROPOFOL 10 MG/ML
INJECTION, EMULSION INTRAVENOUS CONTINUOUS PRN
Status: DISCONTINUED | OUTPATIENT
Start: 2021-09-07 | End: 2021-09-07

## 2021-09-07 RX ORDER — FENTANYL CITRATE/PF 50 MCG/ML
50 SYRINGE (ML) INJECTION
Status: DISCONTINUED | OUTPATIENT
Start: 2021-09-07 | End: 2021-09-07 | Stop reason: HOSPADM

## 2021-09-07 RX ORDER — LIDOCAINE HYDROCHLORIDE AND EPINEPHRINE 10; 10 MG/ML; UG/ML
INJECTION, SOLUTION INFILTRATION; PERINEURAL AS NEEDED
Status: DISCONTINUED | OUTPATIENT
Start: 2021-09-07 | End: 2021-09-07 | Stop reason: HOSPADM

## 2021-09-07 RX ORDER — ONDANSETRON 2 MG/ML
INJECTION INTRAMUSCULAR; INTRAVENOUS AS NEEDED
Status: DISCONTINUED | OUTPATIENT
Start: 2021-09-07 | End: 2021-09-07

## 2021-09-07 RX ORDER — LIDOCAINE HYDROCHLORIDE 10 MG/ML
0.5 INJECTION, SOLUTION EPIDURAL; INFILTRATION; INTRACAUDAL; PERINEURAL ONCE AS NEEDED
Status: DISCONTINUED | OUTPATIENT
Start: 2021-09-07 | End: 2021-09-07 | Stop reason: HOSPADM

## 2021-09-07 RX ORDER — ACETAMINOPHEN 325 MG/1
975 TABLET ORAL ONCE
Status: COMPLETED | OUTPATIENT
Start: 2021-09-07 | End: 2021-09-07

## 2021-09-07 RX ORDER — PROPOFOL 10 MG/ML
INJECTION, EMULSION INTRAVENOUS AS NEEDED
Status: DISCONTINUED | OUTPATIENT
Start: 2021-09-07 | End: 2021-09-07

## 2021-09-07 RX ORDER — PROMETHAZINE HYDROCHLORIDE 25 MG/ML
12.5 INJECTION, SOLUTION INTRAMUSCULAR; INTRAVENOUS ONCE AS NEEDED
Status: DISCONTINUED | OUTPATIENT
Start: 2021-09-07 | End: 2021-09-07 | Stop reason: HOSPADM

## 2021-09-07 RX ORDER — MAGNESIUM HYDROXIDE 1200 MG/15ML
LIQUID ORAL AS NEEDED
Status: DISCONTINUED | OUTPATIENT
Start: 2021-09-07 | End: 2021-09-07 | Stop reason: HOSPADM

## 2021-09-07 RX ORDER — CLINDAMYCIN PHOSPHATE 900 MG/50ML
900 INJECTION INTRAVENOUS ONCE
Status: COMPLETED | OUTPATIENT
Start: 2021-09-07 | End: 2021-09-07

## 2021-09-07 RX ORDER — SODIUM CHLORIDE, SODIUM LACTATE, POTASSIUM CHLORIDE, CALCIUM CHLORIDE 600; 310; 30; 20 MG/100ML; MG/100ML; MG/100ML; MG/100ML
20 INJECTION, SOLUTION INTRAVENOUS CONTINUOUS
Status: DISCONTINUED | OUTPATIENT
Start: 2021-09-07 | End: 2021-09-07 | Stop reason: HOSPADM

## 2021-09-07 RX ORDER — LIDOCAINE HYDROCHLORIDE 10 MG/ML
INJECTION, SOLUTION EPIDURAL; INFILTRATION; INTRACAUDAL; PERINEURAL AS NEEDED
Status: DISCONTINUED | OUTPATIENT
Start: 2021-09-07 | End: 2021-09-07

## 2021-09-07 RX ORDER — DEXAMETHASONE SODIUM PHOSPHATE 4 MG/ML
INJECTION, SOLUTION INTRA-ARTICULAR; INTRALESIONAL; INTRAMUSCULAR; INTRAVENOUS; SOFT TISSUE AS NEEDED
Status: DISCONTINUED | OUTPATIENT
Start: 2021-09-07 | End: 2021-09-07

## 2021-09-07 RX ORDER — SODIUM CHLORIDE, SODIUM LACTATE, POTASSIUM CHLORIDE, CALCIUM CHLORIDE 600; 310; 30; 20 MG/100ML; MG/100ML; MG/100ML; MG/100ML
50 INJECTION, SOLUTION INTRAVENOUS CONTINUOUS
Status: DISCONTINUED | OUTPATIENT
Start: 2021-09-07 | End: 2021-09-07 | Stop reason: HOSPADM

## 2021-09-07 RX ADMIN — FENTANYL CITRATE 25 MCG: 50 INJECTION, SOLUTION INTRAMUSCULAR; INTRAVENOUS at 15:01

## 2021-09-07 RX ADMIN — FENTANYL CITRATE 25 MCG: 50 INJECTION, SOLUTION INTRAMUSCULAR; INTRAVENOUS at 15:06

## 2021-09-07 RX ADMIN — FENTANYL CITRATE 50 MCG: 50 INJECTION, SOLUTION INTRAMUSCULAR; INTRAVENOUS at 14:54

## 2021-09-07 RX ADMIN — PROPOFOL 120 MCG/KG/MIN: 10 INJECTION, EMULSION INTRAVENOUS at 14:54

## 2021-09-07 RX ADMIN — DEXAMETHASONE SODIUM PHOSPHATE 4 MG: 4 INJECTION, SOLUTION INTRAMUSCULAR; INTRAVENOUS at 14:58

## 2021-09-07 RX ADMIN — SODIUM CHLORIDE, SODIUM LACTATE, POTASSIUM CHLORIDE, AND CALCIUM CHLORIDE: .6; .31; .03; .02 INJECTION, SOLUTION INTRAVENOUS at 14:51

## 2021-09-07 RX ADMIN — LIDOCAINE HYDROCHLORIDE 50 MG: 10 INJECTION, SOLUTION EPIDURAL; INFILTRATION; INTRACAUDAL; PERINEURAL at 14:54

## 2021-09-07 RX ADMIN — ONDANSETRON 4 MG: 2 INJECTION INTRAMUSCULAR; INTRAVENOUS at 14:58

## 2021-09-07 RX ADMIN — PROPOFOL 30 MG: 10 INJECTION, EMULSION INTRAVENOUS at 14:54

## 2021-09-07 RX ADMIN — MIDAZOLAM HYDROCHLORIDE 2 MG: 1 INJECTION, SOLUTION INTRAMUSCULAR; INTRAVENOUS at 14:46

## 2021-09-07 RX ADMIN — SODIUM CHLORIDE, SODIUM LACTATE, POTASSIUM CHLORIDE, AND CALCIUM CHLORIDE 50 ML/HR: .6; .31; .03; .02 INJECTION, SOLUTION INTRAVENOUS at 14:07

## 2021-09-07 RX ADMIN — CLINDAMYCIN PHOSPHATE 900 MG: 900 INJECTION, SOLUTION INTRAVENOUS at 14:48

## 2021-09-07 RX ADMIN — ACETAMINOPHEN 975 MG: 325 TABLET, FILM COATED ORAL at 14:05

## 2021-09-07 RX ADMIN — GABAPENTIN 300 MG: 300 CAPSULE ORAL at 14:05

## 2021-09-07 NOTE — ANESTHESIA PREPROCEDURE EVALUATION
Procedure:  DEBRIDEMENT WOUND [facility] OUT) RIGHT BREAST; CLOSURE (Right Breast)    Relevant Problems   ANESTHESIA (within normal limits)   (-) History of anesthesia complications      CARDIO   (+) HTN (hypertension) (took metoprolol this AM)      ENDO (within normal limits)      GI/HEPATIC  Confirmed NPO appropriate   (-) Gastroesophageal reflux disease      /RENAL (within normal limits)      GYN   (+) Breast cancer (HCC) (Resolved)      HEMATOLOGY (within normal limits)      MUSCULOSKELETAL (within normal limits)      NEURO/PSYCH   (+) Anxiety   (+) Headache      PULMONARY (within normal limits)   (-) Smoking   (-) URI (upper respiratory infection)      Other   (+) BRCA2 gene mutation positive        Physical Exam    Airway    Mallampati score: II  TM Distance: >3 FB  Neck ROM: full     Dental   No notable dental hx     Cardiovascular  Rhythm: regular, Rate: normal, Cardiovascular exam normal    Pulmonary  Pulmonary exam normal Breath sounds clear to auscultation,     Other Findings        Anesthesia Plan  ASA Score- 2     Anesthesia Type- IV sedation with anesthesia with ASA Monitors  Additional Monitors:   Airway Plan:     Comment: I discussed the risks and benefits of IV sedation anesthesia including the possibility of the need to convert to general anesthesia and the potential risk of awareness  The patient was given the opportunity to ask questions, which were answered          Plan Factors-Exercise tolerance (METS): >4 METS  Chart reviewed  EKG reviewed  Existing labs reviewed  Patient is not a current smoker  Induction- intravenous  Postoperative Plan- Plan for postoperative opioid use  Informed Consent- Anesthetic plan and risks discussed with patient  I personally reviewed this patient with the CRNA  Discussed and agreed on the Anesthesia Plan with the CRNA             Lab Results   Component Value Date    WBC 6 92 08/24/2021    HGB 14 1 08/24/2021    HCT 41 7 08/24/2021 MCV 92 08/24/2021     08/24/2021     Lab Results   Component Value Date    SODIUM 137 08/02/2021    K 3 4 (L) 08/02/2021     08/02/2021    CO2 27 08/02/2021    BUN 14 08/02/2021    CREATININE 0 60 08/02/2021    GLUC 141 (H) 03/16/2021    CALCIUM 9 3 08/02/2021     Lab Results   Component Value Date    ALT 29 02/15/2021    AST 20 02/15/2021    ALKPHOS 99 02/15/2021     No results found for: INR, PROTIME  No results found for: HGBA1C

## 2021-09-07 NOTE — INTERVAL H&P NOTE
H&P reviewed  After examining the patient I find no changes in the patients condition since the H&P had been written      Vitals:    09/07/21 1349   BP: 156/85   Pulse: 60   Resp: 16   Temp: 97 9 °F (36 6 °C)   SpO2: 98%

## 2021-09-07 NOTE — ANESTHESIA POSTPROCEDURE EVALUATION
Post-Op Assessment Note    CV Status:  Stable  Pain Score: 0    Pain management: adequate     Mental Status:  Alert and awake   Hydration Status:  Euvolemic   PONV Controlled:  Controlled   Airway Patency:  Patent      Post Op Vitals Reviewed: Yes      Staff: CRNA         No complications documented      BP   108/62   Temp      Pulse  78   Resp   16   SpO2   100

## 2021-09-07 NOTE — DISCHARGE INSTRUCTIONS
[facility]  [address], [address]  Phone: [phone]     Postoperative Instructions for Outpatient Surgery     These instructions are being provided by your doctor to give you basic guidelines during your post-op recovery  Please let our office know if your contact information has changed  Please call the office today for an appointment in one week for your first postoperative care visit  Please call my office at any time if you have drainage from incisions, increased redness or swelling, or a fever greater than 100 5  Dressings: Please apply bacitracin ointment to incision 2 times a day  Wear surgical bra  Activity Restrictions: No strenuous activities  Bathing: You may shower in 48 hours  Pat incision dry  Please do not rub incision  Medications:    Resume pre-op medications     You may take tylenol, aleve, ibuprofen or your current pain medications for pain control

## 2021-09-07 NOTE — OP NOTE
OPERATIVE REPORT  PATIENT NAME: Claudio Wills    :  1970  MRN: [id number]  Pt Location: [facility] ROOM 03    SURGERY DATE: 2021    Surgeon(s) and Role:     * Yesika Robledo MD - Primary    Preop Diagnosis:  HX: breast cancer [Z85 3]    Post-Op Diagnosis Codes:     * HX: breast cancer [Z85 3]    Procedure(s) (LRB):  DEBRIDEMENT WOUND ([facility] OUT) RIGHT BREAST; CLOSURE (Right)    Specimen(s):  * No specimens in log *    Estimated Blood Loss:   10 mL    Drains:  Closed/Suction Drain Left;Lateral Chest Bulb 15 Fr  (Active)   Number of days: 176       Closed/Suction Drain Right;Lateral Chest Bulb 15 Fr  (Active)   Number of days: 176       Closed/Suction Drain Left;Lateral Abdomen Bulb 15 Fr  (Active)   Number of days: 176       Closed/Suction Drain Right;Lateral Abdomen Bulb 15 Fr  (Active)   Number of days: 176       Open Drain Right Breast (Active)   Number of days: 0       Anesthesia Type:   IV Sedation with Anesthesia    Operative Indications:  HX: breast cancer [Z85 3]      Operative Findings:  Right breast excisional debridement 8 x 3 cm   Right breast closure 14 cm     Complications:   None    Procedure and Technique:  Patient was met in the preoperative holding area and all last minute questions were properly answered and addressed  Site of surgery was marked verified perforated to the operative theater placed in supine position  After smooth anesthesia was then induced the right chest was then prepped and draped in usual sterile fashion a time-out was then performed  Procedure started by 1st infiltrating approximately 50 mL of 1% lidocaine with epinephrine along the periphery of the defect  Excision debridement was then performed with curette and scalpel down to subcutaneous tissue in order to remove any devitalized tissue  The wound was then irrigated with normal saline and hemostasis was then performed    Cephalad mastectomy skin flap was then raised up to just below the nipple-areolar complex in order to allow closure without undue tension  Surgicel was applied and a Penrose inserted secured with 3-0 nylon  The skin was then tailor tacked with staples and layered closure was then performed deep dermal 3-0 Monocryl and interrupted simples 4-0 nylon suture in the skin  Dry dressing was then applied       I was present for the entire procedure and A qualified resident physician was not available    Patient Disposition:  PACU  and hemodynamically stable    SIGNATURE: Alexia Lockwood MD  DATE: September 7, 2021  TIME: 3:57 PM

## 2021-09-09 ENCOUNTER — OFFICE VISIT (OUTPATIENT)
Dept: PLASTIC SURGERY | Facility: CLINIC | Age: 51
End: 2021-09-09

## 2021-09-09 VITALS — BODY MASS INDEX: 31.41 KG/M2 | WEIGHT: 184 LBS | HEIGHT: 64 IN | TEMPERATURE: 96.4 F

## 2021-09-09 DIAGNOSIS — Z98.890 S/P FLAP GRAFT: ICD-10-CM

## 2021-09-09 DIAGNOSIS — Z90.13 H/O BILATERAL MASTECTOMY: Primary | ICD-10-CM

## 2021-09-09 PROCEDURE — 99024 POSTOP FOLLOW-UP VISIT: CPT | Performed by: PHYSICIAN ASSISTANT

## 2021-09-09 NOTE — PROGRESS NOTES
Annetta Johnson is  post-op: DEBRIDEMENT WOUND ([address] OUT) RIGHT BREAST; CLOSURE (Right Breast)  Presenting for routine follow-up  S:  Doing well  Patient has noted no excessive redness, swelling, pain and discharge  O:  Wound healing well  Sutures and drain in place  Drainage as expected  Breast is soft  A:  Satisfactory course  P: Sutures left in place  and Drains left in place  Patient to return in 1 week  Xeroform to incision  Patient is to return as needed for redness, swelling, discomfort, or any concern about her surgery

## 2021-09-15 ENCOUNTER — OFFICE VISIT (OUTPATIENT)
Dept: PLASTIC SURGERY | Facility: CLINIC | Age: 51
End: 2021-09-15

## 2021-09-15 DIAGNOSIS — Z98.890 POST-OPERATIVE STATE: Primary | ICD-10-CM

## 2021-09-15 PROCEDURE — 99024 POSTOP FOLLOW-UP VISIT: CPT

## 2021-09-15 NOTE — PROGRESS NOTES
Melani Muñiz was seen in the office today for a follow up visit after debridement of the wound of right breast with closure, done on 9/7  Patient's incision clean, dry, healing beautifully, sutures intact  The penrose drain was removed today as patient reported minimal output since Monday pm ( removal okeyed by dr Wilber Recinos)  The pictures were taken  The incision was covered with bacitracin, xeroform and abdominal pad  Patient will return to our office next week for the sutures removal  All questions were answered to patient's satisfaction

## 2021-09-23 ENCOUNTER — OFFICE VISIT (OUTPATIENT)
Dept: PLASTIC SURGERY | Facility: CLINIC | Age: 51
End: 2021-09-23

## 2021-09-23 VITALS — WEIGHT: 185 LBS | TEMPERATURE: 96.6 F | BODY MASS INDEX: 31.58 KG/M2 | HEIGHT: 64 IN

## 2021-09-23 DIAGNOSIS — Z98.890 POST-OPERATIVE STATE: Primary | ICD-10-CM

## 2021-09-23 PROCEDURE — 99024 POSTOP FOLLOW-UP VISIT: CPT | Performed by: PHYSICIAN ASSISTANT

## 2021-09-23 NOTE — PROGRESS NOTES
Pilar Good is  2 weeks post-op:   09/07/21 3515         Procedure: DEBRIDEMENT WOUND [facility] OUT) RIGHT BREAST; CLOSURE (Right Breast)      Presenting for routine follow-up  S:  Doing well  Patient has noted no excessive redness, swelling and pain  O:  Wound healing well  No dehiscence  A:  Satisfactory course  P: Sutures removed  Patient to return in 2 weeks  No overhead lifting or reaching  Patient is to return as needed for redness, swelling, discomfort, or any concern about her surgery

## 2021-09-24 ENCOUNTER — HOSPITAL ENCOUNTER (EMERGENCY)
Facility: HOSPITAL | Age: 51
Discharge: HOME | End: 2021-09-24
Attending: EMERGENCY MEDICINE
Payer: COMMERCIAL

## 2021-09-24 VITALS
TEMPERATURE: 97.9 F | BODY MASS INDEX: 31.76 KG/M2 | WEIGHT: 185 LBS | RESPIRATION RATE: 18 BRPM | HEART RATE: 70 BPM | OXYGEN SATURATION: 98 % | SYSTOLIC BLOOD PRESSURE: 142 MMHG | DIASTOLIC BLOOD PRESSURE: 98 MMHG

## 2021-09-24 DIAGNOSIS — Z98.890 S/P FLAP GRAFT: Primary | ICD-10-CM

## 2021-09-24 DIAGNOSIS — T81.42XS INFECTION FOLLOWING A PROCEDURE, DEEP INCISIONAL SURGICAL SITE, SEQUELA: Primary | ICD-10-CM

## 2021-09-24 PROCEDURE — 99283 EMERGENCY DEPT VISIT LOW MDM: CPT

## 2021-09-24 PROCEDURE — 99284 EMERGENCY DEPT VISIT MOD MDM: CPT | Performed by: EMERGENCY MEDICINE

## 2021-09-24 RX ORDER — SULFAMETHOXAZOLE AND TRIMETHOPRIM 800; 160 MG/1; MG/1
1 TABLET ORAL EVERY 12 HOURS SCHEDULED
Qty: 20 TABLET | Refills: 0 | Status: SHIPPED | OUTPATIENT
Start: 2021-09-24 | End: 2021-10-04

## 2021-09-28 ENCOUNTER — OFFICE VISIT (OUTPATIENT)
Dept: PLASTIC SURGERY | Facility: CLINIC | Age: 51
End: 2021-09-28

## 2021-09-28 DIAGNOSIS — Z98.890 S/P FLAP GRAFT: ICD-10-CM

## 2021-09-28 DIAGNOSIS — Z90.13 H/O BILATERAL MASTECTOMY: Primary | ICD-10-CM

## 2021-09-28 PROCEDURE — 99024 POSTOP FOLLOW-UP VISIT: CPT | Performed by: PHYSICIAN ASSISTANT

## 2021-09-28 NOTE — PROGRESS NOTES
POST-OP EVALUATION  9/28/2021    Subjective   Woodward Jazz is a 46 y o  female is here today for routine post-operative follow up   09/07/21 1451               Procedure: DEBRIDEMENT WOUND ([address] OUT) RIGHT BREAST; CLOSURE (Right Breast)     She feels that she is doing much better      Past Medical History:   Diagnosis Date    Anxiety     Hypertension     Migraines      Past Surgical History:   Procedure Laterality Date    BREAST CYST INCISION AND DRAINAGE Right 9/7/2021    Procedure: DEBRIDEMENT WOUND [facility] OUT) RIGHT BREAST; CLOSURE;  Surgeon: Lopez Bob MD;  [facility];  Service: Plastics    CARPAL TUNNEL RELEASE Bilateral 12/2017    HYSTERECTOMY      LAPAROSCOPIC TOTAL HYSTERECTOMY      OOPHORECTOMY Bilateral 02/27/2015    ME BREAST RECONSTRUCTION W/FREE FLAP Bilateral 3/15/2021    Procedure: BREAST IMMEDIATE RECONSTRUCTION WITH FLAP FREE DEEP INFERIOR EPIGASTRIC  (JAMES); EXCISION RIB;  Surgeon: Lopez Bob MD;  [facility];  Service: Plastics    ME MASTECTOMY, SIMPLE, COMPLETE Bilateral 3/15/2021    Procedure: BREAST NIPPLE Jodie Cecy;  Surgeon: Alfredo Friend MD;  [facility];  Service: Surgical Oncology    ME REVISION OF RECONSTRUCTED BREAST Bilateral 8/10/2021    Procedure: BREAST RECONSTRUCTION MOUND REVISION;  Surgeon: Lopez Bob MD;  [facility];  Service: Plastics    VAC DRESSING APPLICATION N/A 6/48/3416    Procedure: Zenia Blonder;  Surgeon: Lopez Bob MD;  [facility];  Service: Plastics        Current Outpatient Medications:     acetaminophen (TYLENOL) 500 mg tablet, Take 2 tablets (1,000 mg total) by mouth 3 (three) times a day, Disp: 60 tablet, Rfl: 0    ALPRAZolam (XANAX) 0 25 mg tablet, Take 0 25 mg by mouth as needed for anxiety, Disp: , Rfl:     aspirin (ECOTRIN) 325 mg EC tablet, TAKE 1 TABLET BY MOUTH EVERY DAY, Disp: 90 tablet, Rfl: 1    CALCIUM PO, Take 600 mg by mouth every morning , Disp: , Rfl:     cetirizine (ZyrTEC) 10 mg tablet, Take 10 mg by mouth as needed , Disp: , Rfl:     clindamycin (CLEOCIN T) 1 % lotion, as needed , Disp: , Rfl:     cyclobenzaprine (FLEXERIL) 10 mg tablet, Take 10 mg by mouth as needed for muscle spasms, Disp: , Rfl:     HYDROcodone-acetaminophen (NORCO) 7 5-325 mg per tablet, Take 1 tablet by mouth as needed for pain, Disp: , Rfl:     lisinopril (ZESTRIL) 10 mg tablet, Take 10 mg by mouth every morning , Disp: , Rfl:     metoprolol tartrate (LOPRESSOR) 50 mg tablet, Take 50 mg by mouth every morning , Disp: , Rfl:     Multiple Vitamin (multivitamin) capsule, Take by mouth, Disp: , Rfl:     naloxone (NARCAN) 4 mg/0 1 mL nasal spray, Administer 1 spray into a nostril  If no response after 2-3 minutes, give another dose in the other nostril using a new spray , Disp: 1 each, Rfl: 0    sertraline (ZOLOFT) 50 mg tablet, Take 50 mg by mouth every morning , Disp: , Rfl:     sulfamethoxazole-trimethoprim (BACTRIM DS) 800-160 mg per tablet, Take 1 tablet by mouth every 12 (twelve) hours for 10 days, Disp: 20 tablet, Rfl: 0    valACYclovir (VALTREX) 1,000 mg tablet, Take 1,000 mg by mouth as needed , Disp: , Rfl:   Allergies: Amoxicillin-pot clavulanate    Objective    Packing removed  Minimal drainage  1 cm wound opening  Remainder of wound is clean, dry, intact  Assessment/Plan   Diagnoses and all orders for this visit:    H/O bilateral nipple sparing mastectomy    S/P b/l JAMES flaps    Daily soap and water  Replace packing strip  ABD's  Followup in one week      Luis Cleary PA-C

## 2021-10-08 ENCOUNTER — OFFICE VISIT (OUTPATIENT)
Dept: PLASTIC SURGERY | Facility: CLINIC | Age: 51
End: 2021-10-08

## 2021-10-08 DIAGNOSIS — Z90.13 H/O BILATERAL MASTECTOMY: Primary | ICD-10-CM

## 2021-10-08 DIAGNOSIS — Z98.890 S/P FLAP GRAFT: ICD-10-CM

## 2021-10-08 PROCEDURE — 99024 POSTOP FOLLOW-UP VISIT: CPT | Performed by: PHYSICIAN ASSISTANT

## 2021-10-14 ENCOUNTER — OFFICE VISIT (OUTPATIENT)
Dept: PLASTIC SURGERY | Facility: CLINIC | Age: 51
End: 2021-10-14

## 2021-10-14 VITALS — TEMPERATURE: 97.5 F | WEIGHT: 185 LBS | BODY MASS INDEX: 31.58 KG/M2 | HEIGHT: 64 IN

## 2021-10-14 DIAGNOSIS — Z98.890 S/P FLAP GRAFT: ICD-10-CM

## 2021-10-14 DIAGNOSIS — Z90.13 H/O BILATERAL MASTECTOMY: Primary | ICD-10-CM

## 2021-10-14 PROCEDURE — 99024 POSTOP FOLLOW-UP VISIT: CPT | Performed by: PHYSICIAN ASSISTANT

## 2021-11-01 ENCOUNTER — OFFICE VISIT (OUTPATIENT)
Dept: PLASTIC SURGERY | Facility: CLINIC | Age: 51
End: 2021-11-01

## 2021-11-01 DIAGNOSIS — Z90.13 H/O BILATERAL MASTECTOMY: ICD-10-CM

## 2021-11-01 DIAGNOSIS — Z98.890 S/P FLAP GRAFT: Primary | ICD-10-CM

## 2021-11-01 PROCEDURE — 99024 POSTOP FOLLOW-UP VISIT: CPT | Performed by: PHYSICIAN ASSISTANT

## 2021-11-02 ENCOUNTER — TELEPHONE (OUTPATIENT)
Dept: SURGICAL ONCOLOGY | Facility: CLINIC | Age: 51
End: 2021-11-02

## 2021-11-02 ENCOUNTER — OFFICE VISIT (OUTPATIENT)
Dept: SURGICAL ONCOLOGY | Facility: CLINIC | Age: 51
End: 2021-11-02
Payer: COMMERCIAL

## 2021-11-02 VITALS
HEART RATE: 74 BPM | DIASTOLIC BLOOD PRESSURE: 82 MMHG | BODY MASS INDEX: 32.95 KG/M2 | HEIGHT: 64 IN | TEMPERATURE: 97.3 F | SYSTOLIC BLOOD PRESSURE: 120 MMHG | OXYGEN SATURATION: 97 % | RESPIRATION RATE: 17 BRPM | WEIGHT: 193 LBS

## 2021-11-02 DIAGNOSIS — Z15.01 BRCA2 GENE MUTATION POSITIVE: Primary | ICD-10-CM

## 2021-11-02 DIAGNOSIS — Z91.89 INCREASED RISK OF BREAST CANCER: ICD-10-CM

## 2021-11-02 DIAGNOSIS — N60.92 ATYPICAL LOBULAR HYPERPLASIA (ALH) OF BOTH BREASTS: ICD-10-CM

## 2021-11-02 DIAGNOSIS — Z15.09 BRCA2 GENE MUTATION POSITIVE: Primary | ICD-10-CM

## 2021-11-02 DIAGNOSIS — N60.91 ATYPICAL LOBULAR HYPERPLASIA (ALH) OF BOTH BREASTS: ICD-10-CM

## 2021-11-02 PROCEDURE — 99213 OFFICE O/P EST LOW 20 MIN: CPT | Performed by: NURSE PRACTITIONER

## 2021-11-02 RX ORDER — DIPHENOXYLATE HYDROCHLORIDE AND ATROPINE SULFATE 2.5; .025 MG/1; MG/1
1 TABLET ORAL
COMMUNITY
Start: 2021-09-22

## 2021-11-02 RX ORDER — DIPHENOXYLATE HYDROCHLORIDE AND ATROPINE SULFATE 2.5; .025 MG/1; MG/1
TABLET ORAL
COMMUNITY
Start: 2021-09-22 | End: 2021-11-02

## 2021-11-02 RX ORDER — SERTRALINE HYDROCHLORIDE 100 MG/1
100 TABLET, FILM COATED ORAL DAILY
COMMUNITY
Start: 2021-08-10

## 2022-01-28 ENCOUNTER — TELEPHONE (OUTPATIENT)
Dept: DERMATOLOGY | Facility: CLINIC | Age: 52
End: 2022-01-28

## 2022-01-28 NOTE — TELEPHONE ENCOUNTER
Called patient to schedule consult from referral work queue  LVM for patient to call the office if interested in making an appt

## 2022-03-21 ENCOUNTER — OFFICE VISIT (OUTPATIENT)
Dept: PLASTIC SURGERY | Facility: CLINIC | Age: 52
End: 2022-03-21
Payer: COMMERCIAL

## 2022-03-21 DIAGNOSIS — Z98.890 S/P FLAP GRAFT: Primary | ICD-10-CM

## 2022-03-21 PROCEDURE — 99213 OFFICE O/P EST LOW 20 MIN: CPT | Performed by: STUDENT IN AN ORGANIZED HEALTH CARE EDUCATION/TRAINING PROGRAM

## 2022-03-21 NOTE — PROGRESS NOTES
Plastic Surgery Consult    Reason for visit:  Follow-up from bilateral JAMES recon in March 2021    HPI:  Patient is a 47 y/o female with history of BRCA 2 who underwent bilateral prophylactic mastectomies with immediate bilateral JAMES breast reconstruction  Her course was complicated by delayed wound healing at the [facility] of the right breast  She presents today for routine follow-up and is interested in revision of the abdominal incision lateral standing cone deformities  She states that it causes her discomfort with her waist line of her pants  ROS: 12 pt ROS negative, except as otherwise noted in HPI    PMH: HTN  FamHx: non-contrib  SurgHx: bilateral mastectomy with bilateral JAMES breast recon  SocHx: no tobacco, social ETOH  Meds: no blood thinners  Allergies: amoxicillin    PE:  There were no vitals filed for this visit  General: NC/AT, breathing comfortably on RA  Neuro: CN II-XII grossly intact, symmetric reflexes  HEENT: PERRLA, EOMI, external ears normal, no lesions or deformities, neck supple, trachea midline  Respiratory: CTAB, normal respiratory effort  Cardio: RRR, normal S1, S2, no murmur, rubs, gallops  GI: soft, non-tender, non-distended  Extremities/MSK: normal alignment, mobility, gait, no edema  Skin: no rashes, lesions, subcutaneous nodules    Breast exam:  Bilateral large breasts, bilaterally low breast footprint, well healed scars  Nipples present from bilateral nipple sparing mastectomy  Bilateral abdominal standing cutaneous deformities    A/P: 47 y/o female with history of BRCA2 with prophylactic bilateral nipple sparing mastectomies with immediate JAMES breast recon  Doing well, does not want anything else performed on the breasts  The bilateral abdominal standing cutaneous deformities do cause her discomfort due with her waist line of her pants and would like them excised    -Will liposuction the lateral cutaneous abdominal deformities with excision of the standing cutaneous deformities under general   -Photos taken, consents obtained  -Will schedule        Giulia Eaton MD   [facility]   [address] [facility] [address], [address]   Office: [phone]

## 2022-04-18 ENCOUNTER — APPOINTMENT (OUTPATIENT)
Dept: LAB | Facility: MEDICAL CENTER | Age: 52
End: 2022-04-18
Payer: COMMERCIAL

## 2022-04-18 DIAGNOSIS — Z98.890 OTHER SPECIFIED POSTPROCEDURAL STATES: ICD-10-CM

## 2022-04-18 LAB
ANION GAP SERPL CALCULATED.3IONS-SCNC: 7 MMOL/L (ref 4–13)
BASOPHILS # BLD AUTO: 0.08 THOUSANDS/ΜL (ref 0–0.1)
BASOPHILS NFR BLD AUTO: 1 % (ref 0–1)
BUN SERPL-MCNC: 11 MG/DL (ref 5–25)
CALCIUM SERPL-MCNC: 9.5 MG/DL (ref 8.3–10.1)
CHLORIDE SERPL-SCNC: 104 MMOL/L (ref 100–108)
CO2 SERPL-SCNC: 26 MMOL/L (ref 21–32)
CREAT SERPL-MCNC: 0.7 MG/DL (ref 0.6–1.3)
EOSINOPHIL # BLD AUTO: 0.22 THOUSAND/ΜL (ref 0–0.61)
EOSINOPHIL NFR BLD AUTO: 3 % (ref 0–6)
ERYTHROCYTE [DISTWIDTH] IN BLOOD BY AUTOMATED COUNT: 12.8 % (ref 11.6–15.1)
GFR SERPL CREATININE-BSD FRML MDRD: 100 ML/MIN/1.73SQ M
GLUCOSE P FAST SERPL-MCNC: 116 MG/DL (ref 65–99)
HCT VFR BLD AUTO: 42.4 % (ref 34.8–46.1)
HGB BLD-MCNC: 14.5 G/DL (ref 11.5–15.4)
IMM GRANULOCYTES # BLD AUTO: 0.03 THOUSAND/UL (ref 0–0.2)
IMM GRANULOCYTES NFR BLD AUTO: 0 % (ref 0–2)
LYMPHOCYTES # BLD AUTO: 1.24 THOUSANDS/ΜL (ref 0.6–4.47)
LYMPHOCYTES NFR BLD AUTO: 18 % (ref 14–44)
MCH RBC QN AUTO: 31.5 PG (ref 26.8–34.3)
MCHC RBC AUTO-ENTMCNC: 34.2 G/DL (ref 31.4–37.4)
MCV RBC AUTO: 92 FL (ref 82–98)
MONOCYTES # BLD AUTO: 0.38 THOUSAND/ΜL (ref 0.17–1.22)
MONOCYTES NFR BLD AUTO: 6 % (ref 4–12)
NEUTROPHILS # BLD AUTO: 4.78 THOUSANDS/ΜL (ref 1.85–7.62)
NEUTS SEG NFR BLD AUTO: 72 % (ref 43–75)
NRBC BLD AUTO-RTO: 0 /100 WBCS
PLATELET # BLD AUTO: 301 THOUSANDS/UL (ref 149–390)
PMV BLD AUTO: 9.9 FL (ref 8.9–12.7)
POTASSIUM SERPL-SCNC: 3.6 MMOL/L (ref 3.5–5.3)
RBC # BLD AUTO: 4.61 MILLION/UL (ref 3.81–5.12)
SODIUM SERPL-SCNC: 137 MMOL/L (ref 136–145)
WBC # BLD AUTO: 6.73 THOUSAND/UL (ref 4.31–10.16)

## 2022-04-18 PROCEDURE — 85025 COMPLETE CBC W/AUTO DIFF WBC: CPT

## 2022-04-18 PROCEDURE — 36415 COLL VENOUS BLD VENIPUNCTURE: CPT

## 2022-04-18 PROCEDURE — 80048 BASIC METABOLIC PNL TOTAL CA: CPT

## 2022-04-19 RX ORDER — FLUTICASONE PROPIONATE 50 MCG
2 SPRAY, SUSPENSION (ML) NASAL
COMMUNITY
Start: 2022-03-15

## 2022-04-19 RX ORDER — MONTELUKAST SODIUM 10 MG/1
10 TABLET ORAL
COMMUNITY
Start: 2022-03-23 | End: 2023-03-23

## 2022-04-19 NOTE — PRE-PROCEDURE INSTRUCTIONS
Pre-Surgery Instructions:   Medication Instructions    acetaminophen (TYLENOL) 500 mg tablet Take day of surgery  If needed    ALPRAZolam (XANAX) 0 25 mg tablet Take day of surgery  If needed    CALCIUM PO Stop taking 7 days prior to surgery   cetirizine (ZyrTEC) 10 mg tablet Take day of surgery   clindamycin (CLEOCIN T) 1 % lotion Do not apply morning of surgery    cyclobenzaprine (FLEXERIL) 10 mg tablet Hold day of surgery   diphenoxylate-atropine (LOMOTIL) 2 5-0 025 mg per tablet Take day of surgery  If needed    fluticasone (FLONASE) 50 mcg/act nasal spray Take day of surgery   HYDROcodone-acetaminophen (NORCO) 7 5-325 mg per tablet Take day of surgery   lisinopril (ZESTRIL) 10 mg tablet Hold day of surgery   metoprolol tartrate (LOPRESSOR) 50 mg tablet Take day of surgery   montelukast (SINGULAIR) 10 mg tablet Takes in evening  Do not take morning of surgery    Multiple Vitamin (multivitamin) capsule Stop taking 7 days prior to surgery   Pseudoephedrine-Ibuprofen (ADVIL COLD/SINUS PO) Hold day of surgery   sertraline (ZOLOFT) 100 mg tablet Take day of surgery   valACYclovir (VALTREX) 1,000 mg tablet Take day of surgery  Covid screening negative as per patient  Informed of [facility] policy  Patient verbalized understanding  Reviewed showering and medication instructions  Take medications with a small sip of water  Patient verbalized understanding  Advised NPO after MN and ASC will call with scheduled surgical time

## 2022-04-20 ENCOUNTER — ANESTHESIA EVENT (OUTPATIENT)
Dept: PERIOP | Facility: HOSPITAL | Age: 52
End: 2022-04-20
Payer: COMMERCIAL

## 2022-04-21 ENCOUNTER — ANESTHESIA (OUTPATIENT)
Dept: ANESTHESIOLOGY | Facility: HOSPITAL | Age: 52
End: 2022-04-21

## 2022-04-21 ENCOUNTER — ANESTHESIA EVENT (OUTPATIENT)
Dept: ANESTHESIOLOGY | Facility: HOSPITAL | Age: 52
End: 2022-04-21

## 2022-04-21 PROCEDURE — NC001 PR NO CHARGE: Performed by: STUDENT IN AN ORGANIZED HEALTH CARE EDUCATION/TRAINING PROGRAM

## 2022-04-22 ENCOUNTER — ANESTHESIA (OUTPATIENT)
Dept: PERIOP | Facility: HOSPITAL | Age: 52
End: 2022-04-22
Payer: COMMERCIAL

## 2022-04-22 ENCOUNTER — HOSPITAL ENCOUNTER (OUTPATIENT)
Facility: HOSPITAL | Age: 52
Setting detail: OUTPATIENT SURGERY
Discharge: HOME | End: 2022-04-22
Attending: STUDENT IN AN ORGANIZED HEALTH CARE EDUCATION/TRAINING PROGRAM | Admitting: STUDENT IN AN ORGANIZED HEALTH CARE EDUCATION/TRAINING PROGRAM
Payer: COMMERCIAL

## 2022-04-22 VITALS
BODY MASS INDEX: 32.44 KG/M2 | OXYGEN SATURATION: 93 % | DIASTOLIC BLOOD PRESSURE: 82 MMHG | WEIGHT: 190 LBS | HEART RATE: 74 BPM | TEMPERATURE: 97.6 F | SYSTOLIC BLOOD PRESSURE: 151 MMHG | HEIGHT: 64 IN | RESPIRATION RATE: 19 BRPM

## 2022-04-22 DIAGNOSIS — Z98.890 S/P FLAP GRAFT: Primary | ICD-10-CM

## 2022-04-22 PROCEDURE — 15877 SUCTION LIPECTOMY TRUNK: CPT | Performed by: PHYSICIAN ASSISTANT

## 2022-04-22 PROCEDURE — 15877 SUCTION LIPECTOMY TRUNK: CPT | Performed by: STUDENT IN AN ORGANIZED HEALTH CARE EDUCATION/TRAINING PROGRAM

## 2022-04-22 RX ORDER — ONDANSETRON 2 MG/ML
4 INJECTION INTRAMUSCULAR; INTRAVENOUS ONCE AS NEEDED
Status: DISCONTINUED | OUTPATIENT
Start: 2022-04-22 | End: 2022-04-22 | Stop reason: HOSPADM

## 2022-04-22 RX ORDER — IBUPROFEN 600 MG/1
600 TABLET ORAL EVERY 6 HOURS PRN
Qty: 21 TABLET | Refills: 0 | Status: SHIPPED | OUTPATIENT
Start: 2022-04-22

## 2022-04-22 RX ORDER — SODIUM CHLORIDE, SODIUM LACTATE, POTASSIUM CHLORIDE, CALCIUM CHLORIDE 600; 310; 30; 20 MG/100ML; MG/100ML; MG/100ML; MG/100ML
50 INJECTION, SOLUTION INTRAVENOUS CONTINUOUS
Status: DISCONTINUED | OUTPATIENT
Start: 2022-04-22 | End: 2022-04-22 | Stop reason: HOSPADM

## 2022-04-22 RX ORDER — ONDANSETRON 2 MG/ML
INJECTION INTRAMUSCULAR; INTRAVENOUS AS NEEDED
Status: DISCONTINUED | OUTPATIENT
Start: 2022-04-22 | End: 2022-04-22

## 2022-04-22 RX ORDER — LIDOCAINE HYDROCHLORIDE 10 MG/ML
INJECTION, SOLUTION EPIDURAL; INFILTRATION; INTRACAUDAL; PERINEURAL AS NEEDED
Status: DISCONTINUED | OUTPATIENT
Start: 2022-04-22 | End: 2022-04-22

## 2022-04-22 RX ORDER — ACETAMINOPHEN 325 MG/1
975 TABLET ORAL ONCE
Status: COMPLETED | OUTPATIENT
Start: 2022-04-22 | End: 2022-04-22

## 2022-04-22 RX ORDER — CLINDAMYCIN PHOSPHATE 900 MG/50ML
900 INJECTION INTRAVENOUS ONCE
Status: COMPLETED | OUTPATIENT
Start: 2022-04-22 | End: 2022-04-22

## 2022-04-22 RX ORDER — FENTANYL CITRATE/PF 50 MCG/ML
25 SYRINGE (ML) INJECTION
Status: DISCONTINUED | OUTPATIENT
Start: 2022-04-22 | End: 2022-04-22 | Stop reason: HOSPADM

## 2022-04-22 RX ORDER — GABAPENTIN 300 MG/1
300 CAPSULE ORAL ONCE
Status: COMPLETED | OUTPATIENT
Start: 2022-04-22 | End: 2022-04-22

## 2022-04-22 RX ORDER — FENTANYL CITRATE 50 UG/ML
INJECTION, SOLUTION INTRAMUSCULAR; INTRAVENOUS AS NEEDED
Status: DISCONTINUED | OUTPATIENT
Start: 2022-04-22 | End: 2022-04-22

## 2022-04-22 RX ORDER — PROPOFOL 10 MG/ML
INJECTION, EMULSION INTRAVENOUS AS NEEDED
Status: DISCONTINUED | OUTPATIENT
Start: 2022-04-22 | End: 2022-04-22

## 2022-04-22 RX ORDER — DEXAMETHASONE SODIUM PHOSPHATE 10 MG/ML
INJECTION, SOLUTION INTRAMUSCULAR; INTRAVENOUS AS NEEDED
Status: DISCONTINUED | OUTPATIENT
Start: 2022-04-22 | End: 2022-04-22

## 2022-04-22 RX ORDER — DEXMEDETOMIDINE HYDROCHLORIDE 100 UG/ML
INJECTION, SOLUTION INTRAVENOUS AS NEEDED
Status: DISCONTINUED | OUTPATIENT
Start: 2022-04-22 | End: 2022-04-22

## 2022-04-22 RX ORDER — ROCURONIUM BROMIDE 10 MG/ML
INJECTION, SOLUTION INTRAVENOUS AS NEEDED
Status: DISCONTINUED | OUTPATIENT
Start: 2022-04-22 | End: 2022-04-22

## 2022-04-22 RX ORDER — MIDAZOLAM HYDROCHLORIDE 2 MG/2ML
INJECTION, SOLUTION INTRAMUSCULAR; INTRAVENOUS AS NEEDED
Status: DISCONTINUED | OUTPATIENT
Start: 2022-04-22 | End: 2022-04-22

## 2022-04-22 RX ADMIN — Medication 25 MCG: at 19:33

## 2022-04-22 RX ADMIN — FENTANYL CITRATE 100 MCG: 50 INJECTION INTRAMUSCULAR; INTRAVENOUS at 18:18

## 2022-04-22 RX ADMIN — DEXMEDETOMIDINE HCL 10 MCG: 100 INJECTION INTRAVENOUS at 18:43

## 2022-04-22 RX ADMIN — Medication 25 MCG: at 19:44

## 2022-04-22 RX ADMIN — GABAPENTIN 300 MG: 300 CAPSULE ORAL at 15:15

## 2022-04-22 RX ADMIN — ONDANSETRON 4 MG: 2 INJECTION INTRAMUSCULAR; INTRAVENOUS at 18:18

## 2022-04-22 RX ADMIN — MIDAZOLAM 2 MG: 1 INJECTION INTRAMUSCULAR; INTRAVENOUS at 18:10

## 2022-04-22 RX ADMIN — CLINDAMYCIN PHOSPHATE 900 MG: 900 INJECTION, SOLUTION INTRAVENOUS at 18:21

## 2022-04-22 RX ADMIN — DEXAMETHASONE SODIUM PHOSPHATE 10 MG: 10 INJECTION, SOLUTION INTRAMUSCULAR; INTRAVENOUS at 18:18

## 2022-04-22 RX ADMIN — Medication 25 MCG: at 19:39

## 2022-04-22 RX ADMIN — Medication 25 MCG: at 19:51

## 2022-04-22 RX ADMIN — ROCURONIUM BROMIDE 30 MG: 50 INJECTION INTRAVENOUS at 18:18

## 2022-04-22 RX ADMIN — SODIUM CHLORIDE, SODIUM LACTATE, POTASSIUM CHLORIDE, AND CALCIUM CHLORIDE 50 ML/HR: .6; .31; .03; .02 INJECTION, SOLUTION INTRAVENOUS at 15:20

## 2022-04-22 RX ADMIN — Medication 25 MCG: at 20:07

## 2022-04-22 RX ADMIN — LIDOCAINE HYDROCHLORIDE 50 MG: 10 INJECTION, SOLUTION EPIDURAL; INFILTRATION; INTRACAUDAL; PERINEURAL at 18:18

## 2022-04-22 RX ADMIN — Medication 25 MCG: at 20:03

## 2022-04-22 RX ADMIN — ACETAMINOPHEN 975 MG: 325 TABLET ORAL at 15:15

## 2022-04-22 RX ADMIN — PROPOFOL 200 MG: 10 INJECTION, EMULSION INTRAVENOUS at 18:18

## 2022-04-22 NOTE — DISCHARGE INSTRUCTIONS
-Take over-the-counter tylenol or ibuprofen for mild to moderate pain    -Do not drive or operate heavy machinery when taking narcotic pain medicine as it can cause drowsiness   -No showering for 48 hours - do not get your incisions wet  After 48 hours you may shower, but do not scrub directly over the incision  Do not submerge incisions (no baths, pools, hottubs)  -No strenuous activity, no heavy lifting (nothing over 5 lbs) for 7 days   -Take all medicines as prescribed  -Resume regular diet and home medications  -Call Plastic Surgery office to schedule post-op follow in 10-14 days for post operative care          Adama Amado MD   [facility]   [address]   [address]   Office: [phone]

## 2022-04-22 NOTE — ANESTHESIA PREPROCEDURE EVALUATION
Procedure:  EXCISION OF BILATERAL ABDOMONAL STANDING CONE DEFORMITY FROM BREAST RECONSTRUCTION (Bilateral Breast)  CLOSURE WOUND BILATERAL ABDOMONAL STANDING CONE DEFORMITY FROM BREAST RECONSTRUCTION (Bilateral Breast)    Relevant Problems   CARDIO   (+) HTN (hypertension)      NEURO/PSYCH   (+) Anxiety   (+) Headache      Other   (+) H/O bilateral nipple sparing mastectomy   (+) S/P b/l JAMES flaps        Physical Exam    Airway    Mallampati score: II  TM Distance: >3 FB  Neck ROM: full     Dental       Cardiovascular      Pulmonary      Other Findings        Anesthesia Plan  ASA Score- 2     Anesthesia Type- general with ASA Monitors  Additional Monitors:   Airway Plan: ETT  Plan Factors-Exercise tolerance (METS): >4 METS  Chart reviewed  Existing labs reviewed  Patient summary reviewed  Induction- intravenous  Postoperative Plan- Plan for postoperative opioid use  Planned trial extubation    Informed Consent- Anesthetic plan and risks discussed with patient  I personally reviewed this patient with the CRNA  Discussed and agreed on the Anesthesia Plan with the CRNA  Chante Olvera

## 2022-04-22 NOTE — ANESTHESIA POSTPROCEDURE EVALUATION
Post-Op Assessment Note    CV Status:  Stable  Pain Score: 0    Pain management: adequate     Mental Status:  Awake   Hydration Status:  Stable   PONV Controlled:  None   Airway Patency:  Patent      Post Op Vitals Reviewed: Yes      Staff: CRNA         No complications documented      BP   166/92   Temp     Pulse  92   Resp   14   SpO2   100

## 2022-04-22 NOTE — OP NOTE
OPERATIVE REPORT  PATIENT NAME: Kimberly Herring    :  1970  MRN: [id number]  Pt Location: [facility] OR ROOM 08    SURGERY DATE: 2022    Surgeon(s) and Role:     * Jennifer Mcintosh MD - Primary     * Tima Campos PA-C - Assisting    Preop Diagnosis:  Other specified postprocedural states [Z98 890]    Post-Op Diagnosis Codes:     * Other specified postprocedural states [Z98 890]    Procedure(s) (LRB):  1  Excision of right abdominal standing cone deformity (11x2 5 cm, complex closure 12 5 cm)  2  Excision of left abdominal standing cone deformity (10 x 3 cm, complex closure, 11 cm)  3  Liposuction of right flank  4  Liposuction of left flank    Specimen(s):  * No specimens in log *    Estimated Blood Loss:   Minimal    Drains:  Closed/Suction Drain Left;Lateral Chest Bulb 15 Fr  (Active)   Number of days: 403       Closed/Suction Drain Right;Lateral Chest Bulb 15 Fr  (Active)   Number of days: 403       Closed/Suction Drain Left;Lateral Abdomen Bulb 15 Fr  (Active)   Number of days: 403       Closed/Suction Drain Right;Lateral Abdomen Bulb 15 Fr  (Active)   Number of days: 403       Open Drain Right Breast (Active)   Number of days: 227       Anesthesia Type:   General    Operative Indications: Other specified postprocedural states [Z98 890]  History of bilateral breast recon with JAMES, presenting with abdominal contour deformity    Operative Findings:  Right abdominal standing cone deformity (11x2 5 cm, complex closure 12 5 cm)  Left abdominal standing cone deformity (10 x 3 cm, complex closure, 11 cm)    Complications:   None    Procedure and Technique:  Patient was brought to the operating room, transferred to the operating table in supine fashion  After undergoing general anesthesia, a timeout was performed at which point all patient identifiers were deemed to be correct  The abdomen and flanks were prepped and draped in the normal sterile fashion   I first began by remarking the abdominal right and left standing cone deformities  These were excised en bloc extending down to the abdominal fascia  The fields were irrigated and incisions underwent complex closure with undermining of the surrounding skin and subcutaneous tissue of at least 4 cm in all directions to allow for tension free closure  The dermis and subcutaneous tissue were reapproximated with 3-0 vicryl and skin was closed with 3-0 stratafix  Next, 100 cc of normal tumescent solution was injected into each lateral flank  After allowing for the tumescent to take effect, the flanks and lateral abdomen were liposuctioned to satisfactory contour  This concluded the procedure  Patient tolerated the procedure well without complications  At the end of the case, all sponge, needle, and instrument counts were correct  Patient was awakened from anesthesia and taken to the PACU in stable condition      I was present for the entire procedure, A qualified resident physician was available and A physician assistant was required during the procedure for retraction, tissue handling, dissection and suturing        Patient Disposition:  PACU       SIGNATURE: Shoaib Shay MD  DATE: April 22, 2022  TIME: 7:03 PM

## 2022-04-22 NOTE — H&P
H&P - Plastic Surgery   Duy Hinson 46 y o  female MRN: [id number]   Encounter: [id number]           Assessment:  47 y/o female with history of BRCA2 with prophylactic bilateral nipple sparing mastectomies with immediate JAMES breast recon  Doing well, does not want anything else performed on the breasts  The bilateral abdominal standing cutaneous deformities do cause her discomfort due with her waist line of her pants and would like them excised  -Will liposuction the lateral cutaneous abdominal deformities with excision of the standing cutaneous deformities under general   Plan:  EXCISION OF BILATERAL ABDOMONAL STANDING CONE DEFORMITY FROM BREAST RECONSTRUCTION (Bilateral Breast)       CLOSURE WOUND BILATERAL ABDOMONAL STANDING CONE DEFORMITY FROM BREAST RECONSTRUCTION (Bilateral Breast)        HPI:   Duy Hinson is a 46y o  year old female who presents with history of BRCA 2 who underwent bilateral prophylactic mastectomies with immediate bilateral JAMES breast reconstruction  Her course was complicated by delayed wound healing at the [facility] of the right breast  She presents today for routine follow-up and is interested in revision of the abdominal incision lateral standing cone deformities  She states that it causes her discomfort with her waist line of her pants  REVIEW OF SYSTEMS    GENERAL/CONSTITUTIONAL: The patient denies fever, fatigue, weakness, weight gain or weight loss  HEAD, EYES, EARS, NOSE AND THROAT: Eyes - The patient denies pain, redness, loss of vision, double or blurred vision and denies wearing glasses  The patient denies ringing in the ears, nosebleeds sinusitis, post nasal drip  Also denies frequent sore throats, hoarseness, painful swallowing  CARDIOVASCULAR: The patient denies chest pain, irregular heartbeats, palpitations, shortness of breath, heart murmurs, high blood pressure, cramps in his legs with walking, pain in his feet or toes at night or varicose veins    RESPIRATORY: The patient denies chronic cough, wheezing or night sweats  GASTROINTESTINAL: The patient denies decreased appetite, nausea, vomiting, diarrhea, constipation, blood in the stools  GENITOURINARY: The patient denies difficult urination, pain or burning with urination, blood in the urine  MUSCULOSKELETAL: The patient denies arm, thigh or calf cramps  No joint or muscle pain  No muscle weakness or tenderness  No joint swelling, neck pain, back pain or major orthopedic injuries  SKIN AND BREASTS: see hpi The patient denies easy bruising, skin redness, skin rash, hives  NEUROLOGIC: The patient denies headache, dizziness, fainting, memory loss  PSYCHIATRIC: The patient denies depression anxiety  ENDOCRINE: The patient denies intolerance to hot or cold temperature, flushing, fingernail changes, increased thirst, increased salt intake or decreased sexual desire  HEMATOLOGIC/LYMPHATIC: The patient denies anemia, bleeding tendency or clotting tendency  ALLERGIC/IMMUNOLOGIC: The patient denies rhinitis, asthma, skin sensitivity, latex allergies or sensitivity        Historical Information   Past Medical History:   Diagnosis Date    Anxiety     Cancer [facility])     Breast    Chronic headaches     Due to allergies well controlled on allergy meds    Environmental allergies     Hypertension     Irregular heart beat     Migraines      Past Surgical History:   Procedure Laterality Date    BREAST CYST INCISION AND DRAINAGE Right 9/7/2021    Procedure: DEBRIDEMENT WOUND [facility] OUT) RIGHT BREAST; CLOSURE;  Surgeon: Charisma Lorenz MD;  Location: [facility];  Service: Plastics    CARPAL TUNNEL RELEASE Bilateral 12/2017    HYSTERECTOMY      LAPAROSCOPIC TOTAL HYSTERECTOMY      OOPHORECTOMY Bilateral 02/27/2015    FL BREAST RECONSTRUCTION W/FREE FLAP Bilateral 3/15/2021    Procedure: BREAST IMMEDIATE RECONSTRUCTION WITH FLAP FREE DEEP INFERIOR EPIGASTRIC  (JAMES); EXCISION RIB;  Surgeon: Lexie Valentine Marvel Jeans, MD;  Location[facility];  Service: Plastics    OR MASTECTOMY, SIMPLE, COMPLETE Bilateral 3/15/2021    Procedure: BREAST NIPPLE [place];  Surgeon: Jemima Dinh MD;  Location[facility];  Service: Surgical Oncology    OR REVISION OF RECONSTRUCTED BREAST Bilateral 8/10/2021    Procedure: BREAST RECONSTRUCTION MOUND REVISION;  Surgeon: Stalin Peres MD;  [facility];  Service: Plastics    VAC DRESSING APPLICATION N/A 0/71/8437    Procedure: Ferol Guess;  Surgeon: Stalin Peres MD;  [facility];  Service: Plastics     Social History   Social History     Substance and Sexual Activity   Alcohol Use Yes    Comment: Rarely     Social History     Substance and Sexual Activity   Drug Use Never     Social History     Tobacco Use   Smoking Status Never Smoker   Smokeless Tobacco Never Used     Family History:   Family History   Problem Relation Age of Onset    Ovarian cancer Mother 76    BRCA2 Positive Mother     Breast cancer Sister 40    BRCA2 Positive Sister     BRCA2 Positive Daughter     Breast cancer Maternal Grandmother 45    BRCA2 Positive Sister        Meds/Allergies   No current facility-administered medications for this encounter      Current Outpatient Medications:     acetaminophen (TYLENOL) 500 mg tablet, Take 2 tablets (1,000 mg total) by mouth 3 (three) times a day, Disp: 60 tablet, Rfl: 0    ALPRAZolam (XANAX) 0 25 mg tablet, Take 0 25 mg by mouth as needed for anxiety, Disp: , Rfl:     CALCIUM PO, Take 600 mg by mouth every morning , Disp: , Rfl:     cetirizine (ZyrTEC) 10 mg tablet, Take 10 mg by mouth daily  , Disp: , Rfl:     clindamycin (CLEOCIN T) 1 % lotion, as needed , Disp: , Rfl:     cyclobenzaprine (FLEXERIL) 10 mg tablet, Take 10 mg by mouth as needed for muscle spasms, Disp: , Rfl:     diphenoxylate-atropine (LOMOTIL) 2 5-0 025 mg per tablet, Take 1 tablet by mouth, Disp: , Rfl:     fluticasone (FLONASE) 50 mcg/act nasal spray, 2 sprays into each nostril, Disp: , Rfl:     HYDROcodone-acetaminophen (NORCO) 7 5-325 mg per tablet, Take 1 tablet by mouth as needed for pain, Disp: , Rfl:     lisinopril (ZESTRIL) 10 mg tablet, Take 10 mg by mouth every morning , Disp: , Rfl:     metoprolol tartrate (LOPRESSOR) 50 mg tablet, Take 50 mg by mouth every morning , Disp: , Rfl:     montelukast (SINGULAIR) 10 mg tablet, Take 10 mg by mouth, Disp: , Rfl:     Multiple Vitamin (multivitamin) capsule, Take by mouth, Disp: , Rfl:     Pseudoephedrine-Ibuprofen (ADVIL COLD/SINUS PO), Take by mouth, Disp: , Rfl:     sertraline (ZOLOFT) 100 mg tablet, Take 100 mg by mouth daily, Disp: , Rfl:     valACYclovir (VALTREX) 1,000 mg tablet, Take 1,000 mg by mouth daily  , Disp: , Rfl:     aspirin (ECOTRIN) 325 mg EC tablet, TAKE 1 TABLET BY MOUTH EVERY DAY, Disp: 90 tablet, Rfl: 1    naloxone (NARCAN) 4 mg/0 1 mL nasal spray, Administer 1 spray into a nostril   If no response after 2-3 minutes, give another dose in the other nostril using a new spray , Disp: 1 each, Rfl: 0      Objective     General: NC/AT, breathing comfortably on RA  Neuro: CN II-XII grossly intact, symmetric reflexes  HEENT: PERRLA, EOMI, external ears normal, no lesions or deformities, neck supple, trachea midline  Respiratory: CTAB, normal respiratory effort  Cardio: RRR, normal S1, S2, no murmur, rubs, gallops  GI: soft, non-tender, non-distended  Extremities/MSK: normal alignment, mobility, gait, no edema  Skin: no rashes, lesions, subcutaneous nodules     Breast exam:  Bilateral large breasts, bilaterally low breast footprint, well healed scars  Nipples present from bilateral nipple sparing mastectomy  Bilateral abdominal standing cutaneous deformities    Lab Results:   Lab Results   Component Value Date    WBC 6 73 04/18/2022    HGB 14 5 04/18/2022    HCT 42 4 04/18/2022    MCV 92 04/18/2022     04/18/2022          No results found for: TISSUECULT, WOUNDCULT      Imaging Studies:   No results found  EKG, Pathology, and Other Studies:   Lab Results   Component Value Date/Time    [facility]  03/15/2021 09:39 AM     A  Breast, Right, Mastectomy:  - Benign breast tissue with atypical lobular hyperplasia (ALH) and fibrocystic changes  B  Breast, Left, Mastectomy:  - Benign breast tissue with atypical lobular hyperplasia (ALH) and fibrocystic changes           No intake or output data in the 24 hours ending 04/21/22 9933    Invasive Devices  Report    Drain            Closed/Suction Drain Left;Lateral Abdomen Bulb 15 Fr  402 days    Closed/Suction Drain Left;Lateral Chest Bulb 15 Fr  402 days    Closed/Suction Drain Right;Lateral Abdomen Bulb 15 Fr  402 days    Closed/Suction Drain Right;Lateral Chest Bulb 15 Fr  402 days    Open Drain Right Breast 226 days                VTE Prophylaxis: Sequential compression device [place])     Code Status: Prior  Advance Directive and Living Will:      Power of :

## 2022-05-02 ENCOUNTER — OFFICE VISIT (OUTPATIENT)
Dept: PLASTIC SURGERY | Facility: CLINIC | Age: 52
End: 2022-05-02

## 2022-05-02 DIAGNOSIS — Z15.09 BRCA2 GENE MUTATION POSITIVE: ICD-10-CM

## 2022-05-02 DIAGNOSIS — Z90.13 H/O BILATERAL MASTECTOMY: ICD-10-CM

## 2022-05-02 DIAGNOSIS — Z98.890 S/P FLAP GRAFT: Primary | ICD-10-CM

## 2022-05-02 DIAGNOSIS — Z15.01 BRCA2 GENE MUTATION POSITIVE: ICD-10-CM

## 2022-05-02 PROCEDURE — 99024 POSTOP FOLLOW-UP VISIT: CPT | Performed by: PHYSICIAN ASSISTANT

## 2022-05-02 NOTE — PROGRESS NOTES
Assessment/Plan:     Patient is a 40-year-old female with a history of BRCA2 who underwent bilateral prophylactic mastectomies with immediate bilateral Macarena breast reconstruction with Dr Kailash Weiss and Dr Joseph Segura in March, 2021  Her postoperative course was complicated by nonhealing wounds of the right IMF  The patient is now status post excision of bilateral standing cone deformities of the abdomen by Dr Joseph Segura on 04/22/2022  Please see HPI  The patient presents the office today for 1st postoperative check  The patient has had no issues postoperatively and is healing well  She will return to the office in approximately 4 weeks for postop check  She will apply Aquaphor to her incisions for the next 5-7 days, and again silicone scar treatment in 2 weeks  Diagnoses and all orders for this visit:    S/P b/l MACARENA flaps    H/O bilateral nipple sparing mastectomy    BRCA2 gene mutation positive          Subjective:     Patient ID: Ismael Barnes is a 46 y o  female  HPI     The patient reports no issues or concerns postoperatively  She states she was very pleased with her procedure and results  Review of Systems    See HPI    Objective:     Physical Exam      Bilateral incisions are clean, dry and intact  Patient has minimal, expected ecchymosis

## 2022-05-11 ENCOUNTER — TELEPHONE (OUTPATIENT)
Dept: HEMATOLOGY ONCOLOGY | Facility: CLINIC | Age: 52
End: 2022-05-11

## 2022-05-11 NOTE — TELEPHONE ENCOUNTER
Reschedule Appointment     Who is calling in Patient    Doctor Appointment Scheduled with Jesús Mitchell date and time 05/12 at 10:30am   New date and time 05/19 at 10:00am   Location [facility]   Patient verbalized understanding    yes

## 2022-05-19 ENCOUNTER — OFFICE VISIT (OUTPATIENT)
Dept: SURGICAL ONCOLOGY | Facility: CLINIC | Age: 52
End: 2022-05-19
Payer: COMMERCIAL

## 2022-05-19 VITALS
TEMPERATURE: 97.4 F | RESPIRATION RATE: 17 BRPM | HEIGHT: 64 IN | HEART RATE: 53 BPM | SYSTOLIC BLOOD PRESSURE: 144 MMHG | OXYGEN SATURATION: 97 % | BODY MASS INDEX: 32.61 KG/M2 | DIASTOLIC BLOOD PRESSURE: 88 MMHG

## 2022-05-19 DIAGNOSIS — Z15.01 BRCA2 GENE MUTATION POSITIVE: Primary | ICD-10-CM

## 2022-05-19 DIAGNOSIS — N60.92 ATYPICAL LOBULAR HYPERPLASIA (ALH) OF BOTH BREASTS: ICD-10-CM

## 2022-05-19 DIAGNOSIS — Z15.09 BRCA2 GENE MUTATION POSITIVE: Primary | ICD-10-CM

## 2022-05-19 DIAGNOSIS — N60.91 ATYPICAL LOBULAR HYPERPLASIA (ALH) OF BOTH BREASTS: ICD-10-CM

## 2022-05-19 PROCEDURE — 99213 OFFICE O/P EST LOW 20 MIN: CPT | Performed by: NURSE PRACTITIONER

## 2022-05-19 NOTE — PROGRESS NOTES
Surgical Oncology Follow Up       [facility]  [facility] [address]    Katie Cordero  1970  [id number]      Chief Complaint   Patient presents with    Follow-up       Assessment/Plan:  1  BRCA2 gene mutation positive  - s/p BSO  - s/p RRM  - to establish care with derm  - 1 year f/u visit    2  Atypical lobular hyperplasia (ALH) of both breasts    Discussion/Summary: Patient is a 77-year-old female who presents today for follow-up visit for an increased risk of breast cancer secondary to a BRCA 2 genetic mutation  She has undergone a total hysterectomy  She underwent risk reducing mastectomies with Dr Bailey Wilks and had Macarena flap reconstruction with Dr Sherren Kitten  Her surgical pathology revealed ALH bilaterally  she offers no new complaints today and there are no concerns on today's exam   She plans on establishing care with a dermatologist in the near future  I will plan to see her back in 1 year  She will receive a clinical breast exam with her PCP in approximately 6 months  She knows to contact us with any changes on her self breast exam   All of her questions were answered today  History of Present Illness:     -Interval History:  Patient presents today for follow-up visit for a BRCA 2 genetic mutation  She notices no changes on her breast exam   She underwent a revision of her abdominal scar recently and is healing well  She has not yet established care with a dermatologist     Review of Systems:  Review of Systems   Constitutional: Negative for chills, fatigue and fever  Respiratory: Negative for cough and shortness of breath  Cardiovascular: Negative for chest pain  Hematological: Negative for adenopathy  Psychiatric/Behavioral: Negative for confusion         Patient Active Problem List   Diagnosis    Increased risk of breast cancer    BRCA2 gene mutation positive    Skin cyst    Anxiety    Headache    HTN (hypertension)    S/P b/l JAMES flaps    H/O bilateral nipple sparing mastectomy    Atypical lobular hyperplasia (ALH) of both breasts     Past Medical History:   Diagnosis Date    Anxiety     Cancer ([address] )     Breast    Chronic headaches     Due to allergies well controlled on allergy meds    Environmental allergies     Hypertension     Irregular heart beat     Migraines      Past Surgical History:   Procedure Laterality Date    BREAST CYST INCISION AND DRAINAGE Right 9/7/2021    Procedure: DEBRIDEMENT WOUND [facility] OUT) RIGHT BREAST; CLOSURE;  Surgeon: Drew Perea MD;  [facility];  Service: Plastics    CARPAL TUNNEL RELEASE Bilateral 12/2017    HYSTERECTOMY      LAPAROSCOPIC TOTAL HYSTERECTOMY      OOPHORECTOMY Bilateral 02/27/2015    AR BREAST RECONSTRUCTION W/FREE FLAP Bilateral 3/15/2021    Procedure: BREAST IMMEDIATE RECONSTRUCTION WITH FLAP FREE DEEP INFERIOR EPIGASTRIC  (JAMES); EXCISION RIB;  Surgeon: Drew Perea MD;  [facility];  Service: Plastics     [address] 3 1-4 CM TRUNK,ARM,LEG Bilateral 4/22/2022    Procedure: EXCISION OF BILATERAL ABDOMONAL STANDING CONE DEFORMITY FROM BREAST RECONSTRUCTION;  Surgeon: Cheri Suarez MD;  [facility];  Service: Plastics    AR MASTECTOMY, SIMPLE, COMPLETE Bilateral 3/15/2021    Procedure: BREAST NIPPLE [place]auth;  Surgeon: Silvino Pepper MD;  [facility];  Service: Surgical Oncology    AR RECMPL WND TRUNK 2 6-7 5 CM Bilateral 4/22/2022    Procedure: CLOSURE WOUND BILATERAL ABDOMONAL STANDING CONE DEFORMITY FROM BREAST RECONSTRUCTION;  Surgeon: Cheri Suarez MD;  [facility];  Service: Plastics    AR REVISION OF RECONSTRUCTED BREAST Bilateral 8/10/2021    Procedure: BREAST RECONSTRUCTION MOUND REVISION;  Surgeon: Drew Perea MD;  [facility];  Service: Plastics    [address] N/A 9/16/1040    Procedure: Eliazar Levine; Surgeon: Norma Rogel MD;  [facility];  Service: Plastics     Family History   Problem Relation Age of Onset    Ovarian cancer Mother 76    BRCA2 Positive Mother     Breast cancer Sister 40    BRCA2 Positive Sister     BRCA2 Positive Daughter     Breast cancer Maternal Grandmother 45    BRCA2 Positive Sister      Social History     Socioeconomic History    Marital status:      Spouse name: Not on file    Number of children: Not on file    Years of education: Not on file    Highest education level: Not on file   Occupational History    Not on file   Tobacco Use    Smoking status: Never Smoker    Smokeless tobacco: Never Used   Vaping Use    Vaping Use: Never used   Substance and Sexual Activity    Alcohol use: Yes     Comment: Rarely    Drug use: Never    Sexual activity: Not Currently   Other Topics Concern    Not on file   Social History Narrative    Not on file     Social Determinants of Health     Financial Resource Strain: Not on file   Food Insecurity: Not on file   Transportation Needs: Not on file   Physical Activity: Not on file   Stress: Not on file   Social Connections: Not on file   Intimate Partner Violence: Not on file   Housing Stability: Not on file       Current Outpatient Medications:     acetaminophen (TYLENOL) 500 mg tablet, Take 2 tablets (1,000 mg total) by mouth 3 (three) times a day, Disp: 60 tablet, Rfl: 0    Acetaminophen 500 MG, Take 2 capsules (1,000 mg total) by mouth every 6 (six) hours as needed for mild pain for up to 21 doses, Disp: 21 capsule, Rfl: 0    ALPRAZolam (XANAX) 0 25 mg tablet, Take 0 25 mg by mouth as needed for anxiety, Disp: , Rfl:     aspirin (ECOTRIN) 325 mg EC tablet, TAKE 1 TABLET BY MOUTH EVERY DAY, Disp: 90 tablet, Rfl: 1    CALCIUM PO, Take 600 mg by mouth every morning , Disp: , Rfl:     cetirizine (ZyrTEC) 10 mg tablet, Take 10 mg by mouth daily  , Disp: , Rfl:     clindamycin (CLEOCIN T) 1 % lotion, as needed , Disp: , Rfl:     cyclobenzaprine (FLEXERIL) 10 mg tablet, Take 10 mg by mouth as needed for muscle spasms, Disp: , Rfl:     diphenoxylate-atropine (LOMOTIL) 2 5-0 025 mg per tablet, Take 1 tablet by mouth, Disp: , Rfl:     fluticasone (FLONASE) 50 mcg/act nasal spray, 2 sprays into each nostril, Disp: , Rfl:     HYDROcodone-acetaminophen (NORCO) 7 5-325 mg per tablet, Take 1 tablet by mouth as needed for pain, Disp: , Rfl:     ibuprofen (MOTRIN) 600 mg tablet, Take 1 tablet (600 mg total) by mouth every 6 (six) hours as needed for mild pain for up to 21 doses, Disp: 21 tablet, Rfl: 0    lisinopril (ZESTRIL) 10 mg tablet, Take 10 mg by mouth every morning , Disp: , Rfl:     metoprolol tartrate (LOPRESSOR) 50 mg tablet, Take 50 mg by mouth every morning , Disp: , Rfl:     montelukast (SINGULAIR) 10 mg tablet, Take 10 mg by mouth, Disp: , Rfl:     Multiple Vitamin (multivitamin) capsule, Take by mouth, Disp: , Rfl:     naloxone (NARCAN) 4 mg/0 1 mL nasal spray, Administer 1 spray into a nostril  If no response after 2-3 minutes, give another dose in the other nostril using a new spray , Disp: 1 each, Rfl: 0    Pseudoephedrine-Ibuprofen (ADVIL COLD/SINUS PO), Take by mouth, Disp: , Rfl:     sertraline (ZOLOFT) 100 mg tablet, Take 100 mg by mouth daily, Disp: , Rfl:     valACYclovir (VALTREX) 1,000 mg tablet, Take 1,000 mg by mouth daily  , Disp: , Rfl:   Allergies   Allergen Reactions    Amoxicillin-Pot Clavulanate Diarrhea     Vitals:    05/19/22 0959   BP: 144/88   Pulse: (!) 53   Resp: 17   Temp: (!) 97 4 °F (36 3 °C)   SpO2: 97%       Physical Exam  Vitals reviewed  Constitutional:       Appearance: Normal appearance  HENT:      Head: Normocephalic and atraumatic  Pulmonary:      Effort: Pulmonary effort is normal    Chest:   Breasts:      Right: No axillary adenopathy or supraclavicular adenopathy  Left: No axillary adenopathy or supraclavicular adenopathy          Comments: Bilateral JAMES flap reconstructed breast are well healed without masses, skin nodules or skin changes  Lymphadenopathy:      Upper Body:      Right upper body: No supraclavicular or axillary adenopathy  Left upper body: No supraclavicular or axillary adenopathy  Skin:     General: Skin is warm and dry  Neurological:      General: No focal deficit present  Mental Status: She is alert and oriented to person, place, and time  Psychiatric:         Mood and Affect: Mood normal            Advance Care Planning/Advance Directives:  Discussed disease status and treatment goals with the patient

## 2023-05-22 ENCOUNTER — OFFICE VISIT (OUTPATIENT)
Dept: SURGICAL ONCOLOGY | Facility: CLINIC | Age: 53
End: 2023-05-22

## 2023-05-22 VITALS
HEIGHT: 64 IN | HEART RATE: 66 BPM | BODY MASS INDEX: 32.78 KG/M2 | SYSTOLIC BLOOD PRESSURE: 120 MMHG | TEMPERATURE: 97.7 F | RESPIRATION RATE: 16 BRPM | OXYGEN SATURATION: 97 % | WEIGHT: 192 LBS | DIASTOLIC BLOOD PRESSURE: 78 MMHG

## 2023-05-22 DIAGNOSIS — Z15.01 BRCA2 GENE MUTATION POSITIVE: Primary | ICD-10-CM

## 2023-05-22 DIAGNOSIS — Z15.09 BRCA2 GENE MUTATION POSITIVE: Primary | ICD-10-CM

## 2023-05-22 DIAGNOSIS — N60.91 ATYPICAL LOBULAR HYPERPLASIA (ALH) OF BOTH BREASTS: ICD-10-CM

## 2023-05-22 DIAGNOSIS — Z91.89 INCREASED RISK OF BREAST CANCER: ICD-10-CM

## 2023-05-22 DIAGNOSIS — N60.92 ATYPICAL LOBULAR HYPERPLASIA (ALH) OF BOTH BREASTS: ICD-10-CM

## 2023-05-22 NOTE — PROGRESS NOTES
Surgical Oncology Follow Up       [facility]  [facility] [address]    Arie Eloise  1970  [id number]      Chief Complaint   Patient presents with   • Other     Office visit       Assessment/Plan:  1  BRCA2 gene mutation positive  - 1 year f/u visit    2  Increased risk of breast cancer      3  Atypical lobular hyperplasia (ALH) of both breasts      Discussion/Summary: Patient is a 60-year-old female who presents today for follow-up visit for an increased risk of breast cancer secondary to a BRCA 2 genetic mutation  Ilir Brian has undergone a total hysterectomy   She underwent risk reducing mastectomies with Dr Cherylene Foy and had Macarena flap reconstruction with Dr Jacque Hatchet surgical pathology revealed [facility] bilaterally     There are no worrisome findings on today's exam   Regarding her intermittent discomfort of bilateral breasts, I suspect that she may have some nerve regeneration to these areas  I instructed her to monitor and call with persistent or worsening symptoms  She could also follow-up with her plastic surgery team if symptoms worsen  I will see her back in 1 year for a follow-up visit or sooner should the need arise  She will call to schedule a dermatology appointment  History of Present Illness:     -Interval History: Patient presents today for follow-up visit for an increased risk of breast cancer  She reports an occasional discomfort in bilateral reconstructed breast as well as by her abdominal scar  She states this is new compared to the last 2 years  She does not appreciated any new lumps, skin changes  She has not yet established care with dermatology  Review of Systems:  Review of Systems   Constitutional: Negative for chills, fatigue and fever  Respiratory: Negative for cough and shortness of breath  Cardiovascular: Negative for chest pain  Hematological: Negative for adenopathy  Psychiatric/Behavioral: Negative for confusion         Patient Active Problem List   Diagnosis   • Increased risk of breast cancer   • BRCA2 gene mutation positive   • Skin cyst   • Anxiety   • Headache   • HTN (hypertension)   • S/P b/l JAMES flaps   • H/O bilateral nipple sparing mastectomy   • Atypical lobular hyperplasia (ALH) of both breasts     Past Medical History:   Diagnosis Date   • Anxiety    • Cancer ([address] )     Breast   • Chronic headaches     Due to allergies well controlled on allergy meds   • Environmental allergies    • Hypertension    • Irregular heart beat    • Migraines      Past Surgical History:   Procedure Laterality Date   • BREAST CYST INCISION AND DRAINAGE Right 9/7/2021    Procedure: DEBRIDEMENT WOUND [facility] OUT) RIGHT BREAST; CLOSURE;  Surgeon: Cheryl Riojas MD;  [facility];  Service: Plastics   • CARPAL TUNNEL RELEASE Bilateral 12/2017   • HYSTERECTOMY     • LAPAROSCOPIC TOTAL HYSTERECTOMY     • OOPHORECTOMY Bilateral 02/27/2015   • WA BREAST RECONSTRUCTION W/FREE FLAP Bilateral 3/15/2021    Procedure: BREAST IMMEDIATE RECONSTRUCTION WITH FLAP FREE DEEP INFERIOR EPIGASTRIC  (JAMES); EXCISION RIB;  Surgeon: Cheryl Riojas MD;  [facility];  Service: Plastics   • WA EXC B9 LESION MRGN XCP SK TG T/A/L 3 1-4 0 CM Bilateral 4/22/2022    Procedure: EXCISION OF BILATERAL ABDOMONAL STANDING CONE DEFORMITY FROM BREAST RECONSTRUCTION;  Surgeon: Ricco Martinez MD;  [facility];  Service: Plastics   • WA MASTECTOMY SIMPLE COMPLETE Bilateral 3/15/2021    Procedure: BREAST NIPPLE Rasheed Meres;  Surgeon: Lalo Burns MD;  [facility];  Service: Surgical Oncology   • WA REPAIR COMPLEX TRUNK 2 6-7 5 CM Bilateral 4/22/2022    Procedure: CLOSURE WOUND BILATERAL ABDOMONAL STANDING CONE DEFORMITY FROM BREAST RECONSTRUCTION;  Surgeon: Ricco Martinez MD;  [facility];  Service: Plastics   • WA REVISION OF RECONSTRUCTED BREAST Bilateral 8/10/2021    Procedure: BREAST RECONSTRUCTION MOUND REVISION;  Surgeon: Jesús Canales MD;  [facility];  Service: Plastics   • VAC DRESSING APPLICATION N/A 8/50/7090    Procedure: Melina Rose;  Surgeon: Jesús Canales MD;  [facility];  Service: Plastics     Family History   Problem Relation Age of Onset   • Ovarian cancer Mother 76   • BRCA2 Positive Mother    • Breast cancer Sister 40   • BRCA2 Positive Sister    • BRCA2 Positive Daughter    • Breast cancer Maternal Grandmother 45   • BRCA2 Positive Sister      Social History     Socioeconomic History   • Marital status:      Spouse name: Not on file   • Number of children: Not on file   • Years of education: Not on file   • Highest education level: Not on file   Occupational History   • Not on file   Tobacco Use   • Smoking status: Never   • Smokeless tobacco: Never   Vaping Use   • Vaping Use: Never used   Substance and Sexual Activity   • Alcohol use: Yes     Comment: Rarely   • Drug use: Never   • Sexual activity: Not Currently   Other Topics Concern   • Not on file   Social History Narrative   • Not on file     Social Determinants of Health     Financial Resource Strain: Not on file   Food Insecurity: Not on file   Transportation Needs: Not on file   Physical Activity: Not on file   Stress: Not on file   Social Connections: Not on file   Intimate Partner Violence: Not on file   Housing Stability: Not on file       Current Outpatient Medications:   •  ALPRAZolam (XANAX) 0 25 mg tablet, Take 0 25 mg by mouth as needed for anxiety, Disp: , Rfl:   •  CALCIUM PO, Take 600 mg by mouth every morning , Disp: , Rfl:   •  cetirizine (ZyrTEC) 10 mg tablet, Take 10 mg by mouth daily  , Disp: , Rfl:   •  cyclobenzaprine (FLEXERIL) 10 mg tablet, Take 10 mg by mouth as needed for muscle spasms, Disp: , Rfl:   •  diphenoxylate-atropine (LOMOTIL) 2 5-0 025 mg per tablet, Take 1 tablet by mouth, Disp: , Rfl:   • fluticasone (FLONASE) 50 mcg/act nasal spray, 2 sprays into each nostril, Disp: , Rfl:   •  HYDROcodone-acetaminophen (NORCO) 7 5-325 mg per tablet, Take 1 tablet by mouth as needed for pain, Disp: , Rfl:   •  lisinopril (ZESTRIL) 10 mg tablet, Take 10 mg by mouth every morning , Disp: , Rfl:   •  metoprolol tartrate (LOPRESSOR) 50 mg tablet, Take 50 mg by mouth every morning , Disp: , Rfl:   •  montelukast (SINGULAIR) 10 mg tablet, Take 10 mg by mouth, Disp: , Rfl:   •  Multiple Vitamin (multivitamin) capsule, Take by mouth, Disp: , Rfl:   •  sertraline (ZOLOFT) 100 mg tablet, Take 100 mg by mouth daily, Disp: , Rfl:   •  valACYclovir (VALTREX) 1,000 mg tablet, Take 1,000 mg by mouth daily  , Disp: , Rfl:   •  acetaminophen (TYLENOL) 500 mg tablet, Take 2 tablets (1,000 mg total) by mouth 3 (three) times a day (Patient not taking: Reported on 5/22/2023), Disp: 60 tablet, Rfl: 0  •  Acetaminophen 500 MG, Take 2 capsules (1,000 mg total) by mouth every 6 (six) hours as needed for mild pain for up to 21 doses (Patient not taking: Reported on 5/22/2023), Disp: 21 capsule, Rfl: 0  •  aspirin (ECOTRIN) 325 mg EC tablet, TAKE 1 TABLET BY MOUTH EVERY DAY (Patient not taking: Reported on 5/22/2023), Disp: 90 tablet, Rfl: 1  •  clindamycin (CLEOCIN T) 1 % lotion, as needed  (Patient not taking: Reported on 5/22/2023), Disp: , Rfl:   •  ibuprofen (MOTRIN) 600 mg tablet, Take 1 tablet (600 mg total) by mouth every 6 (six) hours as needed for mild pain for up to 21 doses (Patient not taking: Reported on 5/22/2023), Disp: 21 tablet, Rfl: 0  •  naloxone (NARCAN) 4 mg/0 1 mL nasal spray, Administer 1 spray into a nostril  If no response after 2-3 minutes, give another dose in the other nostril using a new spray   (Patient not taking: Reported on 5/22/2023), Disp: 1 each, Rfl: 0  •  Pseudoephedrine-Ibuprofen (ADVIL COLD/SINUS PO), Take by mouth (Patient not taking: Reported on 5/22/2023), Disp: , Rfl:   Allergies   Allergen Reactions   • Amoxicillin-Pot Clavulanate Diarrhea     Vitals:    05/22/23 0955   BP: 120/78   Pulse: 66   Resp: 16   Temp: 97 7 °F (36 5 °C)   SpO2: 97%       Physical Exam  Vitals reviewed  Constitutional:       Appearance: Normal appearance  HENT:      Head: Normocephalic and atraumatic  Pulmonary:      Effort: Pulmonary effort is normal    Chest:      Comments: Bilateral JAMES flap reconstructed breast are well healed without masses, skin nodules or skin changes  Keloid scar at medial portion of inferior left breast scar- stable per patient  Lymphadenopathy:      Upper Body:      Right upper body: No supraclavicular or axillary adenopathy  Left upper body: No supraclavicular or axillary adenopathy  Skin:     General: Skin is warm and dry  Neurological:      General: No focal deficit present  Mental Status: She is alert and oriented to person, place, and time  Psychiatric:         Mood and Affect: Mood normal            Advance Care Planning/Advance Directives:  Discussed disease status and treatment goals with the patient

## 2023-10-19 ENCOUNTER — TELEPHONE (OUTPATIENT)
Dept: HEMATOLOGY ONCOLOGY | Facility: CLINIC | Age: 53
End: 2023-10-19

## 2023-10-19 DIAGNOSIS — Z15.09 BRCA2 GENE MUTATION POSITIVE: Primary | ICD-10-CM

## 2023-10-19 DIAGNOSIS — Z15.01 BRCA2 GENE MUTATION POSITIVE: Primary | ICD-10-CM

## 2023-10-19 NOTE — TELEPHONE ENCOUNTER
She can be scheduled as a non-urgent new patient and I will order a  and pelvic ultrasound to be completed prior to her visit.

## 2023-10-19 NOTE — TELEPHONE ENCOUNTER
Patient Call    Who are you speaking with? Patient    If it is not the patient, are they listed on an active communication consent form? N/A   What is the reason for this call? Patient was last seen by Dr. Vicky Suresh in 2015. She states she doesn't have a gyn and is requesting an appt asap because she has cramps. I informed her of our policy after 3 years she would be considered a new patient and would need a referral.  She insists on speaking with Dr. Vicky Suresh because he did her hysterectomy and she has the BRCA 2 gene   Does this require a call back? yes   If a call back is required, please list best call back number [phone]   If a call back is required, advise that a message will be forwarded to their care team and someone will return their call as soon as possible. Did you relay this information to the patient?  yes

## 2024-06-03 ENCOUNTER — OFFICE VISIT (OUTPATIENT)
Dept: SURGICAL ONCOLOGY | Facility: CLINIC | Age: 54
End: 2024-06-03
Payer: COMMERCIAL

## 2024-06-03 VITALS
OXYGEN SATURATION: 96 % | SYSTOLIC BLOOD PRESSURE: 140 MMHG | HEIGHT: 64 IN | WEIGHT: 188.6 LBS | HEART RATE: 61 BPM | BODY MASS INDEX: 32.2 KG/M2 | DIASTOLIC BLOOD PRESSURE: 82 MMHG | TEMPERATURE: 96.7 F

## 2024-06-03 DIAGNOSIS — Z15.09 BRCA2 GENE MUTATION POSITIVE: ICD-10-CM

## 2024-06-03 DIAGNOSIS — N60.92 ATYPICAL LOBULAR HYPERPLASIA (ALH) OF BOTH BREASTS: ICD-10-CM

## 2024-06-03 DIAGNOSIS — Z15.01 BRCA2 GENE MUTATION POSITIVE: ICD-10-CM

## 2024-06-03 DIAGNOSIS — Z91.89 INCREASED RISK OF BREAST CANCER: Primary | ICD-10-CM

## 2024-06-03 DIAGNOSIS — N60.91 ATYPICAL LOBULAR HYPERPLASIA (ALH) OF BOTH BREASTS: ICD-10-CM

## 2024-06-03 PROCEDURE — 99213 OFFICE O/P EST LOW 20 MIN: CPT | Performed by: NURSE PRACTITIONER

## 2024-06-03 NOTE — PROGRESS NOTES
Surgical Oncology Follow Up       [address]  [facility] [place]  [address]  [facility] [address]    Jazz Wylie  1970  [id number]      Chief Complaint   Patient presents with    Follow-up       Assessment/Plan:  1. Increased risk of breast cancer  - 1 year f/u visit    2. Atypical lobular hyperplasia (ALH) of both breasts      3. BRCA2 gene mutation positive  - s/p RRM  - s/p BSO  - following with derm   - no family history pancreatic ca        Discussion/Summary: Patient is a 53-year-old female who presents today for follow-up visit for an increased risk of breast cancer secondary to a BRCA 2 genetic mutation.  She has undergone a total hysterectomy.  She underwent risk reducing mastectomies with Dr. Laws and had Macarena flap reconstruction with Dr. Davila.  Her surgical pathology revealed ALH bilaterally.  She offers no new complaints today and there are no worrisome findings on today's clinical exam.  She will continue annual visits with dermatology.  No family history of pancreatic cancer so no pancreatic cancer screening is warranted.  I will see her back in 1 year or sooner should the need arise.  She was instructed to contact us with any changes or concerns in the interim.  All of her questions were answered today.    History of Present Illness:     -Interval History: Patient presents today for follow-up visit.  She has not appreciated any changes on self-exam.  She was evaluated by a dermatologist in December and reports no concerns.  She will follow with them annually.  Reports no changes in family history.    Review of Systems:  Review of Systems   Constitutional:  Negative for chills, fatigue and fever.   Respiratory:  Negative for cough and shortness of breath.    Cardiovascular:  Negative for chest pain.   Hematological:  Negative for adenopathy.   Psychiatric/Behavioral:  Negative for confusion.        Patient Active Problem List   Diagnosis     Increased risk of breast cancer    BRCA2 gene mutation positive    Skin cyst    Anxiety    Headache    HTN (hypertension)    S/P b/l JAMES flaps    H/O bilateral nipple sparing mastectomy    Atypical lobular hyperplasia (ALH) of both breasts     Past Medical History:   Diagnosis Date    Anxiety     Cancer ([facility])     Breast    Chronic headaches     Due to allergies well controlled on allergy meds    Environmental allergies     Hypertension     Irregular heart beat     Migraines      Past Surgical History:   Procedure Laterality Date    BREAST CYST INCISION AND DRAINAGE Right 9/7/2021    Procedure: DEBRIDEMENT WOUND (WASH OUT) RIGHT BREAST; CLOSURE;  Surgeon: Jhon Garcia MD;  [facility];  Service: Plastics    CARPAL TUNNEL RELEASE Bilateral 12/2017    HYSTERECTOMY      LAPAROSCOPIC TOTAL HYSTERECTOMY      OOPHORECTOMY Bilateral 02/27/2015    MD BREAST RECONSTRUCTION W/FREE FLAP Bilateral 3/15/2021    Procedure: BREAST IMMEDIATE RECONSTRUCTION WITH FLAP FREE DEEP INFERIOR EPIGASTRIC  (JAMES); EXCISION RIB;  Surgeon: Jhon Garcia MD;  [facility];  Service: Plastics    MD EXC B9 LESION MRGN XCP SK TG T/A/L 3.1-4.0 CM Bilateral 4/22/2022    Procedure: EXCISION OF BILATERAL ABDOMONAL STANDING CONE DEFORMITY FROM BREAST RECONSTRUCTION;  Surgeon: Romero Brooks MD;  [facility];  Service: Plastics    MD MASTECTOMY SIMPLE COMPLETE Bilateral 3/15/2021    Procedure: BREAST NIPPLE SPARING MATECTOMY;  Surgeon: Yohan Laws MD;  [facility];  Service: Surgical Oncology    MD REPAIR COMPLEX TRUNK 2.6-7.5 CM Bilateral 4/22/2022    Procedure: CLOSURE WOUND BILATERAL ABDOMONAL STANDING CONE DEFORMITY FROM BREAST RECONSTRUCTION;  Surgeon: Romero Brooks MD;  [facility];  Service: Plastics    MD REVISION OF RECONSTRUCTED BREAST Bilateral 8/10/2021    Procedure: BREAST RECONSTRUCTION MOUND REVISION;  Surgeon: Jhon Garcia MD;  [facility];  Service: Plastics    VAC DRESSING APPLICATION N/A 3/15/2021    Procedure: PREVENA PLACEMENT;  Surgeon: Jhon Garcia MD;  [facility];  Service: Plastics     Family History   Problem Relation Age of Onset    Ovarian cancer Mother 68    BRCA2 Positive Mother     Breast cancer Sister 44    BRCA2 Positive Sister     BRCA2 Positive Daughter     Breast cancer Maternal Grandmother 38    BRCA2 Positive Sister      Social History     Socioeconomic History    Marital status:      Spouse name: Not on file    Number of children: Not on file    Years of education: Not on file    Highest education level: Not on file   Occupational History    Not on file   Tobacco Use    Smoking status: Never    Smokeless tobacco: Never   Vaping Use    Vaping status: Never Used   Substance and Sexual Activity    Alcohol use: Yes     Comment: Rarely    Drug use: Never    Sexual activity: Not Currently   Other Topics Concern    Not on file   Social History Narrative    Not on file     Social Determinants of Health     Financial Resource Strain: Not on file   Food Insecurity: Not on file   Transportation Needs: Not on file   Physical Activity: Not on file   Stress: Not on file   Social Connections: Not on file   Intimate Partner Violence: Not on file   Housing Stability: Not on file       Current Outpatient Medications:     ALPRAZolam (XANAX) 0.25 mg tablet, Take 0.25 mg by mouth as needed for anxiety, Disp: , Rfl:     CALCIUM PO, Take 600 mg by mouth every morning , Disp: , Rfl:     diphenoxylate-atropine (LOMOTIL) 2.5-0.025 mg per tablet, Take 1 tablet by mouth, Disp: , Rfl:     fluticasone (FLONASE) 50 mcg/act nasal spray, 2 sprays into each nostril, Disp: , Rfl:     HYDROcodone-acetaminophen (NORCO) 7.5-325 mg per tablet, Take 1 tablet by mouth as needed for pain, Disp: , Rfl:     lisinopril (ZESTRIL) 10 mg tablet, Take 10 mg by mouth every morning , Disp: , Rfl:     metoprolol tartrate (LOPRESSOR) 50 mg tablet,  Take 50 mg by mouth every morning , Disp: , Rfl:     Multiple Vitamin (multivitamin) capsule, Take by mouth, Disp: , Rfl:     sertraline (ZOLOFT) 100 mg tablet, Take 100 mg by mouth daily, Disp: , Rfl:     valACYclovir (VALTREX) 1,000 mg tablet, Take 1,000 mg by mouth daily  , Disp: , Rfl:     acetaminophen (TYLENOL) 500 mg tablet, Take 2 tablets (1,000 mg total) by mouth 3 (three) times a day (Patient not taking: Reported on 5/22/2023), Disp: 60 tablet, Rfl: 0    Acetaminophen 500 MG, Take 2 capsules (1,000 mg total) by mouth every 6 (six) hours as needed for mild pain for up to 21 doses (Patient not taking: Reported on 5/22/2023), Disp: 21 capsule, Rfl: 0    aspirin (ECOTRIN) 325 mg EC tablet, TAKE 1 TABLET BY MOUTH EVERY DAY (Patient not taking: Reported on 5/22/2023), Disp: 90 tablet, Rfl: 1    cetirizine (ZyrTEC) 10 mg tablet, Take 10 mg by mouth daily  , Disp: , Rfl:     clindamycin (CLEOCIN T) 1 % lotion, as needed  (Patient not taking: Reported on 5/22/2023), Disp: , Rfl:     cyclobenzaprine (FLEXERIL) 10 mg tablet, Take 10 mg by mouth as needed for muscle spasms, Disp: , Rfl:     ibuprofen (MOTRIN) 600 mg tablet, Take 1 tablet (600 mg total) by mouth every 6 (six) hours as needed for mild pain for up to 21 doses (Patient not taking: Reported on 5/22/2023), Disp: 21 tablet, Rfl: 0    montelukast (SINGULAIR) 10 mg tablet, Take 10 mg by mouth, Disp: , Rfl:     naloxone (NARCAN) 4 mg/0.1 mL nasal spray, Administer 1 spray into a nostril. If no response after 2-3 minutes, give another dose in the other nostril using a new spray. (Patient not taking: Reported on 5/22/2023), Disp: 1 each, Rfl: 0    Pseudoephedrine-Ibuprofen (ADVIL COLD/SINUS PO), Take by mouth (Patient not taking: Reported on 5/22/2023), Disp: , Rfl:   Allergies   Allergen Reactions    Amoxicillin-Pot Clavulanate Diarrhea     Vitals:    06/03/24 1004   BP: 140/82   Pulse: 61   Temp: (!) 96.7 °F (35.9 °C)   SpO2: 96%       Physical Exam  Vitals  reviewed.   Constitutional:       Appearance: Normal appearance.   HENT:      Head: Normocephalic and atraumatic.   Pulmonary:      Effort: Pulmonary effort is normal.   Chest:      Comments: Bilateral JAMES flap reconstructed breast are well healed without masses, skin nodules or skin changes. Keloid scar at medial portion of inferior left breast scar- stable   Lymphadenopathy:      Upper Body:      Right upper body: No supraclavicular or axillary adenopathy.      Left upper body: No supraclavicular or axillary adenopathy.   Skin:     General: Skin is warm and dry.   Neurological:      General: No focal deficit present.      Mental Status: She is alert and oriented to person, place, and time.   Psychiatric:         Mood and Affect: Mood normal.           Advance Care Planning/Advance Directives:  Discussed disease status and treatment goals with the patient.

## 2025-05-27 ENCOUNTER — TELEPHONE (OUTPATIENT)
Dept: SURGICAL ONCOLOGY | Facility: CLINIC | Age: 55
End: 2025-05-27

## 2025-05-27 NOTE — TELEPHONE ENCOUNTER
Spoke with pt regarding her cancelled appt on 6/16/25 @ 8:30 AM.  New appt is 9/9/25 @ 9:30 AM with Ellen Davis

## (undated) DEVICE — SURGIFOAM 8.5 X 12.5

## (undated) DEVICE — PAD GROUNDING ADULT

## (undated) DEVICE — TELFA NON-ADHERENT ABSORBENT DRESSING: Brand: TELFA

## (undated) DEVICE — MEDI-VAC NON-CONDUCTIVE SUCTION TUBING 6MM X 1.8M (6FT.) L: Brand: CARDINAL HEALTH

## (undated) DEVICE — TIBURON TRANSVERSE LAPAROTOMY SHEET: Brand: CONVERTORS

## (undated) DEVICE — MICROCLIP HEMOSTATIC SUPERFINE TI

## (undated) DEVICE — SUT SILK 2-0 SH 30 IN K833H

## (undated) DEVICE — GLOVE SRG BIOGEL 7

## (undated) DEVICE — BULB SYRINGE,IRRIGATION WITH PROTECTIVE CAP: Brand: DOVER

## (undated) DEVICE — BETHLEHEM UNIVERSAL MINOR GEN: Brand: CARDINAL HEALTH

## (undated) DEVICE — NEEDLE 25G X 1 1/2

## (undated) DEVICE — X-RAY DETECTABLE SPONGES,16 PLY: Brand: VISTEC

## (undated) DEVICE — SMOKE EVACUATION TUBING WITH 8 IN INTEGRAL WAND AND SPONGE GUARD: Brand: BUFFALO FILTER

## (undated) DEVICE — HEMOCLIP CARTRIDGE SM

## (undated) DEVICE — VIAL DECANTER

## (undated) DEVICE — ABDOMINAL PAD: Brand: DERMACEA

## (undated) DEVICE — JP CHAN DRN SIL HUBLESS 15FR W/TRO: Brand: CARDINAL HEALTH

## (undated) DEVICE — SPONGE STICK WITH PVP-I: Brand: KENDALL

## (undated) DEVICE — STERILE MUSCLE FLAP PACK: Brand: CARDINAL HEALTH

## (undated) DEVICE — PENROSE DRAIN, 18 X 3 8: Brand: CARDINAL HEALTH

## (undated) DEVICE — OCCLUSIVE GAUZE STRIP,3% BISMUTH TRIBROMOPHENATE IN PETROLATUM BLEND: Brand: XEROFORM

## (undated) DEVICE — CHLORAPREP HI-LITE 26ML ORANGE

## (undated) DEVICE — PROXIMATE SKIN STAPLERS (35 WIDE) CONTAINS 35 STAINLESS STEEL STAPLES (FIXED HEAD): Brand: PROXIMATE

## (undated) DEVICE — MEDI-VAC YANKAUER SUCTION HANDLE W/STRAIGHT TIP & CONTROL VENT: Brand: CARDINAL HEALTH

## (undated) DEVICE — ELECTRODE BLADE MOD E-Z CLEAN  2.75IN 7CM -0012AM

## (undated) DEVICE — INTENDED FOR TISSUE SEPARATION, AND OTHER PROCEDURES THAT REQUIRE A SHARP SURGICAL BLADE TO PUNCTURE OR CUT.: Brand: BARD-PARKER ® CARBON RIB-BACK BLADES

## (undated) DEVICE — REGULAR TIP OPTHALMIC SPONGE: Brand: MICROSPONGE

## (undated) DEVICE — ULTRASOUND GEL STERILE FOIL PK

## (undated) DEVICE — GLOVE INDICATOR PI UNDERGLOVE SZ 7.5 BLUE

## (undated) DEVICE — GLOVE SRG BIOGEL ECLIPSE 7.5

## (undated) DEVICE — PROXIMATE PLUS MD MULTI-DIRECTIONAL RELEASE SKIN STAPLERS CONTAINS 35 STAINLESS STEEL STAPLES APPROXIMATE CLOSED DIMENSIONS: 6.9MM X 3.9MM WIDE: Brand: PROXIMATE

## (undated) DEVICE — WIPES INSTRUMENT 3 X 3IN MEROCEL

## (undated) DEVICE — JACKSON-PRATT 100CC BULB RESERVOIR: Brand: CARDINAL HEALTH

## (undated) DEVICE — SUT VICRYL 2-0 REEL 54 IN J286G

## (undated) DEVICE — CHEST/BREAST DRAPE: Brand: CONVERTORS

## (undated) DEVICE — DRAPE SHEET X-LG

## (undated) DEVICE — SKIN MARKER DUAL TIP WITH RULER CAP, FLEXIBLE RULER AND LABELS: Brand: DEVON

## (undated) DEVICE — PLUMEPEN PRO 10FT

## (undated) DEVICE — LIGHT HANDLE COVER SLEEVE DISP BLUE STELLAR

## (undated) DEVICE — HOOK ELASTIC STAY 12MM BLUNT SNGL STRL

## (undated) DEVICE — DRAPE SHEET THREE QUARTER

## (undated) DEVICE — STAPLER INSORB SUBCUTICULAR 30 SINGLE USE

## (undated) DEVICE — TUBING SUCTION 5MM X 12 FT

## (undated) DEVICE — SUT MONOCRYL 4-0 PS-2 18 IN Y496G

## (undated) DEVICE — SENSOR T OX  PATCH SM

## (undated) DEVICE — 3M™ TEGADERM™ TRANSPARENT FILM DRESSING FRAME STYLE, 1626W, 4 IN X 4-3/4 IN (10 CM X 12 CM), 50/CT 4CT/CASE: Brand: 3M™ TEGADERM™

## (undated) DEVICE — 3M™ STERI-STRIP™ REINFORCED ADHESIVE SKIN CLOSURES, R1547, 1/2 IN X 4 IN (12 MM X 100 MM), 6 STRIPS/ENVELOPE: Brand: 3M™ STERI-STRIP™

## (undated) DEVICE — GLOVE SRG BIOGEL ECLIPSE 8.5

## (undated) DEVICE — PACK UNIVERSAL DRAPES SUB-Q ICD

## (undated) DEVICE — GLOVE SRG BIOGEL 6.5

## (undated) DEVICE — UTILITY MARKER,BLACK WITH LABELS: Brand: DEVON

## (undated) DEVICE — HEMOCLIP CARTRIDGE MED

## (undated) DEVICE — SPONGE LAP 18 X 18 IN

## (undated) DEVICE — DRAPE PROBE NEO-PROBE/ULTRASOUND

## (undated) DEVICE — PREVENA PLUS INCISION MANAGEMENT SYSTEM: Brand: PREVENA PLUS™

## (undated) DEVICE — INTENDED FOR TISSUE SEPARATION, AND OTHER PROCEDURES THAT REQUIRE A SHARP SURGICAL BLADE TO PUNCTURE OR CUT.: Brand: BARD-PARKER SAFETY BLADES SIZE 15, STERILE

## (undated) DEVICE — 3M™ TEGADERM™ TRANSPARENT FILM DRESSING FRAME STYLE, 1628, 6 IN X 8 IN (15 CM X 20 CM), 10/CT 8CT/CASE: Brand: 3M™ TEGADERM™

## (undated) DEVICE — TRAY FOLEY 16FR SURESTEP TEMP SENS URIMETER STAT LOK

## (undated) DEVICE — ELECTRODE NEEDLE MEGAFINE 2IN E-Z CLEAN MEGADYNE -0118

## (undated) DEVICE — 3M™ IOBAN™ 2 ANTIMICROBIAL INCISE DRAPE 6650EZ: Brand: IOBAN™ 2

## (undated) DEVICE — SURGICEL 4 X 8

## (undated) DEVICE — SCD SEQUENTIAL COMPRESSION COMFORT SLEEVE MEDIUM KNEE LENGTH: Brand: KENDALL SCD

## (undated) DEVICE — MEDI-VAC YANK SUCT HNDL W/TPRD BULBOUS TIP: Brand: CARDINAL HEALTH

## (undated) DEVICE — PENCIL ELECTROSURG E-Z CLEAN -0035H

## (undated) DEVICE — DRESSING BIOPATCH ANTIMICROBIAL 1 IN DISC

## (undated) DEVICE — ELECTRODE BLADE MOD  E-Z CLEAN 6.5IN -0014M

## (undated) DEVICE — GLOVE SRG BIOGEL ECLIPSE 8

## (undated) DEVICE — BIPOLAR CORD DISP

## (undated) DEVICE — CYSTO TUBING SINGLE IRRIGATION

## (undated) DEVICE — GLOVE INDICATOR PI UNDERGLOVE SZ 8.5 BLUE

## (undated) DEVICE — SUT MONOCRYL 3-0 SH 27 IN Y416H

## (undated) DEVICE — DRAPE MICROSCOPE ARMATEC 46 X 120IN ANGL SLIM LENS F/LEICA

## (undated) DEVICE — ADHESIVE SKIN HIGH VISCOSITY EXOFIN 1ML

## (undated) DEVICE — INTENDED FOR TISSUE SEPARATION, AND OTHER PROCEDURES THAT REQUIRE A SHARP SURGICAL BLADE TO PUNCTURE OR CUT.: Brand: BARD-PARKER SAFETY BLADES SIZE 10, STERILE

## (undated) DEVICE — MAYO STAND COVER: Brand: CONVERTORS

## (undated) DEVICE — SMOKE EVACUATION PRE-FILTER WITH 7/8" CONNECTORS: Brand: VIROSAFE

## (undated) DEVICE — GLOVE SRG BIOGEL 7.5

## (undated) DEVICE — 1840 FOAM BLOCK NEEDLE COUNTER: Brand: DEVON

## (undated) DEVICE — GAUZE SPONGES,16 PLY: Brand: CURITY

## (undated) DEVICE — DRAPE EQUIPMENT RF WAND

## (undated) DEVICE — BETHLEHEM UNIVERSAL OUTPATIENT: Brand: CARDINAL HEALTH

## (undated) DEVICE — SYRINGE 20ML LL

## (undated) DEVICE — BETHLEHEM UNIVERSAL BREAST PK: Brand: CARDINAL HEALTH

## (undated) DEVICE — GLOVE INDICATOR PI UNDERGLOVE SZ 8 BLUE

## (undated) DEVICE — SUT VICRYL 3-0 SH 27 IN J416H

## (undated) DEVICE — 3M™ TEGADERM™ TRANSPARENT FILM DRESSING FRAME STYLE, 1624W, 2-3/8 IN X 2-3/4 IN (6 CM X 7 CM), 100/CT 4CT/CASE: Brand: 3M™ TEGADERM™

## (undated) DEVICE — MEDI-VAC YANKAUER SUCTION HANDLE W/BULBOUS AND CONTROL VENT: Brand: CARDINAL HEALTH

## (undated) DEVICE — SUT ETHILON 3-0 PS-1 18 IN 1663G

## (undated) DEVICE — SPECIMEN CONTAINER STERILE PEEL PACK

## (undated) DEVICE — MICROCLIP GEM TI 2.4-3.1MM

## (undated) DEVICE — SUT VICRYL 2-0 SH 27 IN UNDYED J417H

## (undated) DEVICE — BRA SURGICAL SZ LGE (36-39)

## (undated) DEVICE — DRAPE FLUID WARMER (BIRD BATH)